# Patient Record
Sex: MALE | Race: WHITE | NOT HISPANIC OR LATINO | Employment: OTHER | URBAN - METROPOLITAN AREA
[De-identification: names, ages, dates, MRNs, and addresses within clinical notes are randomized per-mention and may not be internally consistent; named-entity substitution may affect disease eponyms.]

---

## 2023-03-07 DIAGNOSIS — R10.13 ABDOMINAL PAIN, ACUTE, EPIGASTRIC: Primary | ICD-10-CM

## 2023-03-07 PROBLEM — L30.4 INTERTRIGO: Status: ACTIVE | Noted: 2023-03-07

## 2023-03-07 PROBLEM — M51.26 LUMBAR HERNIATED DISC: Status: ACTIVE | Noted: 2023-03-07

## 2023-03-07 PROBLEM — R73.09 ABNORMAL GLUCOSE: Status: ACTIVE | Noted: 2023-03-07

## 2023-03-07 PROBLEM — R79.89 LOW TSH LEVEL: Status: ACTIVE | Noted: 2023-03-07

## 2023-03-07 PROBLEM — H81.10 BPPV (BENIGN PAROXYSMAL POSITIONAL VERTIGO): Status: ACTIVE | Noted: 2023-03-07

## 2023-03-07 PROBLEM — K22.70 BARRETT'S ESOPHAGUS: Status: ACTIVE | Noted: 2023-03-07

## 2023-03-07 PROBLEM — D49.1 NEOPLASM OF UPPER LOBE OF RIGHT LUNG: Status: ACTIVE | Noted: 2023-03-07

## 2023-03-07 PROBLEM — M54.16 LUMBAR RADICULITIS: Status: ACTIVE | Noted: 2023-03-07

## 2023-03-07 PROBLEM — L30.9 ECZEMA: Status: ACTIVE | Noted: 2023-03-07

## 2023-03-07 PROBLEM — J20.9 ACUTE BRONCHITIS: Status: ACTIVE | Noted: 2023-03-07

## 2023-03-07 PROBLEM — I25.10 CORONARY ATHEROSCLEROSIS: Status: ACTIVE | Noted: 2023-03-07

## 2023-03-07 PROBLEM — R06.02 SHORTNESS OF BREATH ON EXERTION: Status: ACTIVE | Noted: 2023-03-07

## 2023-03-07 PROBLEM — R91.8 LUNG NODULES: Status: ACTIVE | Noted: 2023-03-07

## 2023-03-07 PROBLEM — C34.91: Status: ACTIVE | Noted: 2023-03-07

## 2023-03-07 PROBLEM — R06.09 DYSPNEA ON EXERTION: Status: ACTIVE | Noted: 2023-03-07

## 2023-03-07 PROBLEM — K21.00 GASTROESOPHAGEAL REFLUX DISEASE WITH ESOPHAGITIS: Status: ACTIVE | Noted: 2023-03-07

## 2023-03-07 PROBLEM — R10.9 ABDOMINAL PAIN: Status: ACTIVE | Noted: 2023-03-07

## 2023-03-07 PROBLEM — N40.1 BENIGN PROSTATIC HYPERPLASIA WITH NOCTURIA: Status: ACTIVE | Noted: 2023-03-07

## 2023-03-07 PROBLEM — R97.20 RAISED PROSTATE SPECIFIC ANTIGEN: Status: ACTIVE | Noted: 2023-03-07

## 2023-03-07 PROBLEM — E06.3 ACQUIRED AUTOIMMUNE HYPOTHYROIDISM: Status: ACTIVE | Noted: 2023-03-07

## 2023-03-07 PROBLEM — R35.1 BENIGN PROSTATIC HYPERPLASIA WITH NOCTURIA: Status: ACTIVE | Noted: 2023-03-07

## 2023-03-07 PROBLEM — G47.33 OBSTRUCTIVE SLEEP APNEA: Status: ACTIVE | Noted: 2023-03-07

## 2023-03-07 PROBLEM — E78.00 ELEVATED LDL CHOLESTEROL LEVEL: Status: ACTIVE | Noted: 2023-03-07

## 2023-03-07 PROBLEM — R53.83 FATIGUE: Status: ACTIVE | Noted: 2023-03-07

## 2023-03-07 RX ORDER — LEVOTHYROXINE SODIUM 50 UG/1
TABLET ORAL
COMMUNITY
End: 2023-08-18

## 2023-03-07 RX ORDER — ASCORBIC ACID 500 MG
TABLET ORAL
COMMUNITY
Start: 2022-11-14 | End: 2023-08-28 | Stop reason: ALTCHOICE

## 2023-03-07 RX ORDER — PANTOPRAZOLE SODIUM 20 MG/1
TABLET, DELAYED RELEASE ORAL
COMMUNITY
End: 2023-03-07

## 2023-03-07 RX ORDER — ZINC SULFATE 50(220)MG
CAPSULE ORAL
COMMUNITY
Start: 2022-11-14 | End: 2023-08-28 | Stop reason: ALTCHOICE

## 2023-03-07 RX ORDER — TRIAMCINOLONE ACETONIDE 1 MG/G
CREAM TOPICAL 2 TIMES DAILY
COMMUNITY
Start: 2021-12-17 | End: 2023-08-28 | Stop reason: ALTCHOICE

## 2023-03-07 RX ORDER — TAMSULOSIN HYDROCHLORIDE 0.4 MG/1
0.4 CAPSULE ORAL NIGHTLY
COMMUNITY

## 2023-03-07 RX ORDER — PANTOPRAZOLE SODIUM 40 MG/1
TABLET, DELAYED RELEASE ORAL
Qty: 30 TABLET | Refills: 0 | Status: SHIPPED | OUTPATIENT
Start: 2023-03-07 | End: 2023-04-12 | Stop reason: SDUPTHER

## 2023-03-07 RX ORDER — ATORVASTATIN CALCIUM 40 MG/1
40 TABLET, FILM COATED ORAL NIGHTLY
COMMUNITY

## 2023-03-07 RX ORDER — FAMOTIDINE 20 MG/1
20 TABLET, FILM COATED ORAL NIGHTLY
COMMUNITY
Start: 2023-02-28 | End: 2023-08-28 | Stop reason: ALTCHOICE

## 2023-03-07 RX ORDER — ACETAMINOPHEN, DIPHENHYDRAMINE HCL, PHENYLEPHRINE HCL 325; 25; 5 MG/1; MG/1; MG/1
TABLET ORAL
COMMUNITY
Start: 2022-11-14 | End: 2023-08-28 | Stop reason: ALTCHOICE

## 2023-03-07 RX ORDER — CHOLECALCIFEROL (VITAMIN D3) 25 MCG
TABLET ORAL
COMMUNITY
Start: 2022-11-14 | End: 2023-08-28 | Stop reason: ALTCHOICE

## 2023-04-12 ENCOUNTER — TELEPHONE (OUTPATIENT)
Dept: PRIMARY CARE | Facility: CLINIC | Age: 78
End: 2023-04-12
Payer: MEDICARE

## 2023-04-12 DIAGNOSIS — R10.13 ABDOMINAL PAIN, ACUTE, EPIGASTRIC: ICD-10-CM

## 2023-04-12 RX ORDER — PANTOPRAZOLE SODIUM 40 MG/1
40 TABLET, DELAYED RELEASE ORAL DAILY
Qty: 30 TABLET | Refills: 0 | Status: SHIPPED | OUTPATIENT
Start: 2023-04-12 | End: 2023-05-15 | Stop reason: SDUPTHER

## 2023-04-12 NOTE — TELEPHONE ENCOUNTER
Rx Refill Request Telephone Encounter    Name:  Maxwell Villatoro  :  [id number]  Medication Name:  Pantoprazole 40mg 1 tab qd    Specific Pharmacy location:  [facility]  Date of last appointment:  23  Date of next appointment:  23  Best number to reach patient:  [phone]

## 2023-05-15 ENCOUNTER — TELEPHONE (OUTPATIENT)
Dept: PRIMARY CARE | Facility: CLINIC | Age: 78
End: 2023-05-15
Payer: MEDICARE

## 2023-05-15 DIAGNOSIS — R10.13 ABDOMINAL PAIN, ACUTE, EPIGASTRIC: ICD-10-CM

## 2023-05-15 RX ORDER — PANTOPRAZOLE SODIUM 40 MG/1
40 TABLET, DELAYED RELEASE ORAL DAILY
Qty: 90 TABLET | Refills: 1 | Status: SHIPPED | OUTPATIENT
Start: 2023-05-15 | End: 2023-11-10

## 2023-05-15 NOTE — TELEPHONE ENCOUNTER
Rx Refill Request Telephone Encounter  PT IS OUT  Name:  Maxwell Villatoro  :  [id number]  Medication Name:    Pantoprazole 40 mg 1 tab every day -90 day  Specific Pharmacy location:  [facility]  Date of last appointment:  23  Date of next appointment:  23  Best number to reach patient:  [phone]

## 2023-07-20 ENCOUNTER — APPOINTMENT (OUTPATIENT)
Dept: LAB | Facility: LAB | Age: 78
End: 2023-07-20
Payer: MEDICARE

## 2023-07-21 LAB — PROSTATE SPECIFIC AG (NG/ML) IN SER/PLAS: 5.89 NG/ML (ref 0–4)

## 2023-08-18 DIAGNOSIS — E06.3 ACQUIRED AUTOIMMUNE HYPOTHYROIDISM: Primary | ICD-10-CM

## 2023-08-18 PROBLEM — M25.562 LEFT KNEE PAIN: Status: RESOLVED | Noted: 2023-08-18 | Resolved: 2023-08-18

## 2023-08-18 PROBLEM — M71.22 SYNOVIAL CYST OF LEFT KNEE: Status: ACTIVE | Noted: 2023-08-18

## 2023-08-18 PROBLEM — K40.20 NON-RECURRENT BILATERAL INGUINAL HERNIA WITHOUT OBSTRUCTION OR GANGRENE: Status: ACTIVE | Noted: 2023-08-18

## 2023-08-18 PROBLEM — K42.9 UMBILICAL HERNIA WITHOUT OBSTRUCTION OR GANGRENE: Status: ACTIVE | Noted: 2023-08-18

## 2023-08-18 RX ORDER — LEVOTHYROXINE SODIUM 25 UG/1
TABLET ORAL
COMMUNITY
End: 2023-08-28 | Stop reason: DRUGHIGH

## 2023-08-18 RX ORDER — LEVOTHYROXINE SODIUM 50 UG/1
TABLET ORAL
Qty: 98 TABLET | Refills: 0 | Status: SHIPPED | OUTPATIENT
Start: 2023-08-18 | End: 2023-11-10

## 2023-08-28 ENCOUNTER — OFFICE VISIT (OUTPATIENT)
Dept: PRIMARY CARE | Facility: CLINIC | Age: 78
End: 2023-08-28
Payer: MEDICARE

## 2023-08-28 VITALS
BODY MASS INDEX: 30.63 KG/M2 | SYSTOLIC BLOOD PRESSURE: 118 MMHG | DIASTOLIC BLOOD PRESSURE: 70 MMHG | HEART RATE: 74 BPM | OXYGEN SATURATION: 98 % | HEIGHT: 66 IN | WEIGHT: 190.6 LBS

## 2023-08-28 DIAGNOSIS — Z13.1 SCREENING FOR DIABETES MELLITUS: ICD-10-CM

## 2023-08-28 DIAGNOSIS — C34.91 SQUAMOUS CELL CARCINOMA OF LUNG, RIGHT (MULTI): ICD-10-CM

## 2023-08-28 DIAGNOSIS — E06.3 ACQUIRED AUTOIMMUNE HYPOTHYROIDISM: ICD-10-CM

## 2023-08-28 DIAGNOSIS — Z00.00 ROUTINE GENERAL MEDICAL EXAMINATION AT HEALTH CARE FACILITY: Primary | ICD-10-CM

## 2023-08-28 DIAGNOSIS — I25.10 ATHEROSCLEROSIS OF NATIVE CORONARY ARTERY OF NATIVE HEART WITHOUT ANGINA PECTORIS: ICD-10-CM

## 2023-08-28 DIAGNOSIS — R73.09 ABNORMAL GLUCOSE: ICD-10-CM

## 2023-08-28 DIAGNOSIS — R06.09 DYSPNEA ON EXERTION: ICD-10-CM

## 2023-08-28 DIAGNOSIS — G47.33 OBSTRUCTIVE SLEEP APNEA: ICD-10-CM

## 2023-08-28 DIAGNOSIS — D49.1 NEOPLASM OF UPPER LOBE OF RIGHT LUNG: ICD-10-CM

## 2023-08-28 DIAGNOSIS — Z12.5 SCREENING FOR PROSTATE CANCER: ICD-10-CM

## 2023-08-28 PROBLEM — R31.9 HEMATURIA SYNDROME: Status: RESOLVED | Noted: 2023-08-28 | Resolved: 2023-08-28

## 2023-08-28 PROCEDURE — G0439 PPPS, SUBSEQ VISIT: HCPCS | Performed by: NURSE PRACTITIONER

## 2023-08-28 PROCEDURE — 1159F MED LIST DOCD IN RCRD: CPT | Performed by: NURSE PRACTITIONER

## 2023-08-28 PROCEDURE — 1036F TOBACCO NON-USER: CPT | Performed by: NURSE PRACTITIONER

## 2023-08-28 PROCEDURE — 1160F RVW MEDS BY RX/DR IN RCRD: CPT | Performed by: NURSE PRACTITIONER

## 2023-08-28 PROCEDURE — 1170F FXNL STATUS ASSESSED: CPT | Performed by: NURSE PRACTITIONER

## 2023-08-28 ASSESSMENT — PATIENT HEALTH QUESTIONNAIRE - PHQ9
SUM OF ALL RESPONSES TO PHQ9 QUESTIONS 1 AND 2: 0
2. FEELING DOWN, DEPRESSED OR HOPELESS: NOT AT ALL
1. LITTLE INTEREST OR PLEASURE IN DOING THINGS: NOT AT ALL

## 2023-08-28 ASSESSMENT — ENCOUNTER SYMPTOMS
OCCASIONAL FEELINGS OF UNSTEADINESS: 0
MUSCULOSKELETAL NEGATIVE: 1
CONSTITUTIONAL NEGATIVE: 1
PSYCHIATRIC NEGATIVE: 1
LOSS OF SENSATION IN FEET: 0
DEPRESSION: 0
RESPIRATORY NEGATIVE: 1

## 2023-08-28 ASSESSMENT — COLUMBIA-SUICIDE SEVERITY RATING SCALE - C-SSRS
2. HAVE YOU ACTUALLY HAD ANY THOUGHTS OF KILLING YOURSELF?: NO
6. HAVE YOU EVER DONE ANYTHING, STARTED TO DO ANYTHING, OR PREPARED TO DO ANYTHING TO END YOUR LIFE?: NO
1. IN THE PAST MONTH, HAVE YOU WISHED YOU WERE DEAD OR WISHED YOU COULD GO TO SLEEP AND NOT WAKE UP?: NO

## 2023-08-28 ASSESSMENT — ACTIVITIES OF DAILY LIVING (ADL)
GROCERY_SHOPPING: INDEPENDENT
TAKING_MEDICATION: INDEPENDENT
DRESSING: INDEPENDENT
MANAGING_FINANCES: INDEPENDENT
BATHING: INDEPENDENT
DOING_HOUSEWORK: INDEPENDENT

## 2023-08-28 NOTE — PROGRESS NOTES
"Patient was identified as a fall risk. Risk prevention instructions provided.  Subjective   Reason for Visit: Maxwell Villatoro is an 78 y.o. male here for a Medicare Wellness visit.     Past Medical, Surgical, and Family History reviewed and updated in chart.    Reviewed all medications by prescribing practitioner or clinical pharmacist (such as prescriptions, OTCs, herbal therapies and supplements) and documented in the medical record.    Patient here for Medicare wellness    History of lung CA: Patient had a recent scan following up annually with oncology.  No new symptoms or concerns at this time    GERD: Patient reports the symptoms are stable     Acquired hypothyroid: Patient denies any symptoms of hyper or hypothyroid at this time patient due for routine blood work    Cardiovascular disease: Patient is followed by Dr. Multani states follows up with him annually    Patient has no other major concerns today        Patient Care Team:  PRETTY Johnson-CNP as PCP - General (Family Medicine)  Nimesh Gayle DO as PCP - [employer] PCP   Dr Multani: Cardiology  Oncology: Was seen by the Nurse practitioner   Review of Systems   Constitutional: Negative.    HENT: Negative.     Respiratory: Negative.     Musculoskeletal: Negative.    Skin: Negative.    Psychiatric/Behavioral: Negative.         Objective   Vitals:  /70   Pulse 74   Ht 1.676 m (5' 6\")   Wt 86.5 kg (190 lb 9.6 oz)   SpO2 98%   BMI 30.76 kg/m²       Physical Exam  Constitutional:       Appearance: Normal appearance.   Eyes:      Conjunctiva/sclera: Conjunctivae normal.   Cardiovascular:      Rate and Rhythm: Normal rate.      Heart sounds: Normal heart sounds.   Pulmonary:      Effort: Pulmonary effort is normal.      Breath sounds: Normal breath sounds.   Abdominal:      General: Abdomen is flat. There is no distension.      Palpations: Abdomen is soft. There is no mass.      Tenderness: There is no abdominal tenderness. There is no " right CVA tenderness or left CVA tenderness.      Hernia: No hernia is present.   Musculoskeletal:         General: No swelling or tenderness. Normal range of motion.   Skin:     General: Skin is warm.   Neurological:      General: No focal deficit present.      Mental Status: He is alert and oriented to person, place, and time.      Cranial Nerves: No cranial nerve deficit.      Sensory: No sensory deficit.      Motor: No weakness.      Coordination: Coordination normal.   Psychiatric:         Mood and Affect: Mood normal.         Behavior: Behavior normal.         Thought Content: Thought content normal.         Judgment: Judgment normal.         Assessment/Plan   Problem List Items Addressed This Visit       Abnormal glucose    Relevant Orders    Hemoglobin A1C    Acquired autoimmune hypothyroidism    Relevant Orders    TSH with reflex to Free T4 if abnormal    Comprehensive Metabolic Panel    Coronary atherosclerosis    Relevant Orders    Lipid Panel    Comprehensive Metabolic Panel    Neoplasm of upper lobe of right lung    Current Assessment & Plan     He reports stable is to have one more scan/PET scan         Obstructive sleep apnea    Current Assessment & Plan     Stable does not use cpap. He feels rested          Relevant Orders    TSH with reflex to Free T4 if abnormal    Dyspnea on exertion    Current Assessment & Plan     This baseline denies any increasing sob         Squamous cell carcinoma of lung, right (CMS/HCC)     Other Visit Diagnoses       Routine general medical examination at health care facility    -  Primary    Screening for prostate cancer        Relevant Orders    Prostate Specific Antigen, Screen    Screening for diabetes mellitus        Relevant Orders    Hemoglobin A1C          Patient to follow-up every 6 months routine blood work ordered.  Patient stable.  Encouraged given to patient to increase physical activity and exercise.

## 2023-08-28 NOTE — PATIENT INSTRUCTIONS
"   want to emphasize the importance of regular exercise for maintaining and improving your overall health and well-being. Engaging in physical activity is one of the most beneficial things you can do for your body and mind. Here are some key reasons why exercise is crucial for your health:    1. Cardiovascular Health:    Exercise strengthens your heart and improves circulation, reducing the risk of heart disease, high blood pressure, and stroke.  Regular aerobic exercise, like walking, jogging, or cycling, helps keep your cardiovascular system in excellent condition.  2. Weight Management:    Regular physical activity plays a vital role in managing and maintaining a healthy weight.  Combining exercise with a balanced diet can help you achieve and sustain a healthy body weight.  3. Muscle Strength and Flexibility:    Exercise helps build and maintain strong muscles, which support and protect your bones and joints.  Stretching exercises improve flexibility, reducing the risk of injuries and promoting better posture.  4. Bone Health:    Weight-bearing exercises, such as walking, dancing, or weightlifting, stimulate bone growth and help prevent bone loss as you age.  Strong bones reduce the risk of osteoporosis and fractures.  5. Mental Health and Mood:    Physical activity triggers the release of endorphins, the \"feel-good\" hormones, which can help reduce stress, anxiety, and depression.  Regular exercise is associated with improved mood, better sleep, and enhanced cognitive function.  6. Energy and Endurance:    Exercise boosts your energy levels and increases your stamina, making daily activities easier to accomplish.  Improved endurance allows you to engage in activities for more extended periods without feeling fatigued.  7. Chronic Disease Prevention:    Regular exercise can help prevent or manage various chronic conditions, such as type 2 diabetes, certain cancers, and metabolic syndrome.  8. Immune System " Support:    Exercise can enhance the immune system, making it more effective in defending against illnesses and infections.  9. Social Interaction:    Participating in group exercise classes or outdoor activities provides opportunities for social interaction, reducing feelings of isolation and loneliness.  10. Longevity and Quality of Life:    Studies show that individuals who maintain an active lifestyle tend to live longer and enjoy a higher quality of life.   Ways to Help Prevent Falls at Home    Quick Tips   ? Ask for help if you need it. Most people want to help!   ? Get up slowly after sitting or laying down   ? Wear a medical alert device or keep cell phone in your pocket   ? Use night lights, especially areas near a bathroom   ? Keep the items you use often within reach on a small stool or end table   ? Use an assistive device such as walker or cane, as directed by provider/physical therapy   ? Use a non-slip mat and grab bars in your bathroom. Look for home health sections for best options     Other Areas to Focus On   ? Exercise and nutrition: Regular exercise or taking a falls prevention class are great ways improve strength and balance. Don’t forget to stay hydrated and bring a snack!   ? Medicine side effects: Some medicines can make you sleepy or dizzy, which could cause a fall. Ask your healthcare provider about the side effects your medicines could cause. Be sure to let them know if you take any vitamins or supplements as well.   ? Tripping hazards: Remove items you could trip on, such as loose mats, rugs, cords, and clutter. Wear closed toe shoes with rubber soles.   ? Health and wellness: Get regular checkups with your healthcare provider, plus routine vision and hearing screenings. Talk with your healthcare provider about:   o Your medicines and the possible side effects - bring them in a bag if that is easier!   o Problems with balance or feeling dizzy   o Ways to promote bone health, such as  Vitamin D and calcium supplements   o Questions or concerns about falling     *Ask your healthcare team if you have questions     ©[facility], 2022

## 2023-10-31 ENCOUNTER — LAB (OUTPATIENT)
Dept: LAB | Facility: LAB | Age: 78
End: 2023-10-31
Payer: MEDICARE

## 2023-10-31 DIAGNOSIS — Z12.5 SCREENING FOR PROSTATE CANCER: ICD-10-CM

## 2023-10-31 DIAGNOSIS — R73.09 ABNORMAL GLUCOSE: ICD-10-CM

## 2023-10-31 DIAGNOSIS — Z13.1 SCREENING FOR DIABETES MELLITUS: ICD-10-CM

## 2023-10-31 DIAGNOSIS — G47.33 OBSTRUCTIVE SLEEP APNEA: ICD-10-CM

## 2023-10-31 DIAGNOSIS — I25.10 ATHEROSCLEROSIS OF NATIVE CORONARY ARTERY OF NATIVE HEART WITHOUT ANGINA PECTORIS: ICD-10-CM

## 2023-10-31 DIAGNOSIS — E06.3 ACQUIRED AUTOIMMUNE HYPOTHYROIDISM: ICD-10-CM

## 2023-10-31 LAB
ALBUMIN SERPL BCP-MCNC: 4.2 G/DL (ref 3.4–5)
ALP SERPL-CCNC: 75 U/L (ref 33–136)
ALT SERPL W P-5'-P-CCNC: 17 U/L (ref 10–52)
ANION GAP SERPL CALC-SCNC: 12 MMOL/L (ref 10–20)
AST SERPL W P-5'-P-CCNC: 18 U/L (ref 9–39)
BILIRUB SERPL-MCNC: 0.6 MG/DL (ref 0–1.2)
BUN SERPL-MCNC: 12 MG/DL (ref 6–23)
CALCIUM SERPL-MCNC: 9.1 MG/DL (ref 8.6–10.3)
CHLORIDE SERPL-SCNC: 106 MMOL/L (ref 98–107)
CHOLEST SERPL-MCNC: 115 MG/DL (ref 0–199)
CHOLESTEROL/HDL RATIO: 3.4
CO2 SERPL-SCNC: 26 MMOL/L (ref 21–32)
CREAT SERPL-MCNC: 0.97 MG/DL (ref 0.5–1.3)
GFR SERPL CREATININE-BSD FRML MDRD: 80 ML/MIN/1.73M*2
GLUCOSE SERPL-MCNC: 73 MG/DL (ref 74–99)
HDLC SERPL-MCNC: 34.3 MG/DL
LDLC SERPL CALC-MCNC: 63 MG/DL
NON HDL CHOLESTEROL: 81 MG/DL (ref 0–149)
POTASSIUM SERPL-SCNC: 4.2 MMOL/L (ref 3.5–5.3)
PROT SERPL-MCNC: 6.8 G/DL (ref 6.4–8.2)
SODIUM SERPL-SCNC: 140 MMOL/L (ref 136–145)
TRIGL SERPL-MCNC: 91 MG/DL (ref 0–149)
TSH SERPL-ACNC: 3.64 MIU/L (ref 0.44–3.98)
VLDL: 18 MG/DL (ref 0–40)

## 2023-10-31 PROCEDURE — 80061 LIPID PANEL: CPT

## 2023-10-31 PROCEDURE — 80053 COMPREHEN METABOLIC PANEL: CPT

## 2023-10-31 PROCEDURE — 84443 ASSAY THYROID STIM HORMONE: CPT

## 2023-10-31 PROCEDURE — G0103 PSA SCREENING: HCPCS

## 2023-10-31 PROCEDURE — 83036 HEMOGLOBIN GLYCOSYLATED A1C: CPT

## 2023-10-31 PROCEDURE — 36415 COLL VENOUS BLD VENIPUNCTURE: CPT

## 2023-11-01 LAB
EST. AVERAGE GLUCOSE BLD GHB EST-MCNC: 97 MG/DL
HBA1C MFR BLD: 5 %
PSA SERPL-MCNC: 5.29 NG/ML

## 2023-11-03 ENCOUNTER — TELEPHONE (OUTPATIENT)
Dept: PRIMARY CARE | Facility: CLINIC | Age: 78
End: 2023-11-03
Payer: MEDICARE

## 2023-11-03 NOTE — TELEPHONE ENCOUNTER
----- Message from Diane Fields APRN-CNP sent at 11/2/2023  8:43 AM EDT -----  PSA Slightly Elevated: Your PSA (Prostate-Specific Antigen) level is slightly elevated. We recommend that you follow up with a new healthcare provider to have this checked again and discuss any further steps.    Hemoglobin A1c Normal: Your Hemoglobin A1c level is well within the normal range, indicating good blood sugar control.    Thyroid Normal: Your thyroid function is within normal limits.    Lipid Panel Controlled: Your lipid panel shows good control, which is positive for heart health.    CMP Stable: Your Comprehensive Metabolic Panel (CMP) is stable, with only a slightly low glucose level, which is not a significant concern, especially if you were fasting.

## 2023-11-03 NOTE — TELEPHONE ENCOUNTER
Result Communication    Resulted Orders   Prostate Specific Antigen, Screen   Result Value Ref Range    Prostate Specific Antigen,Screen 5.29 (H) <=4.00 ng/mL    Narrative    The FDA requires that the method used for PSA assay be reported to the physician. Values obtained with different assay methods must not be used interchangeably. This test was performed at [facility] using Siemens NVMdurance PSA method, which is a sandwich immunoassay using chemiluminescence for quantitation. The assay is approved for measurement of prostate-specific antigen (PSA) in serum and may be used in conjunction with a digital rectal examination in men 50 years and older as an aid in the detection of prostate cancer. 5 Alpha-reductase inhibitors (e.g., Proscar, Finasteride, Avodart, Dutasteride, and Josselin) for the treatment of BPH have been shown to lower PSA levels by an average of 50% after 6 months of treatment.        TSH with reflex to Free T4 if abnormal   Result Value Ref Range    Thyroid Stimulating Hormone 3.64 0.44 - 3.98 mIU/L    Narrative    TSH testing is performed using different testing methodology at [facility] than at other [facility]. Direct result comparisons should only be made within the same method.     Lipid Panel   Result Value Ref Range    Cholesterol 115 0 - 199 mg/dL      Comment:            Age      Desirable   Borderline High   High     0-19 Y     0 - 169       170 - 199     >/= 200    20-24 Y     0 - 189       190 - 224     >/= 225         >24 Y     0 - 199       200 - 239     >/= 240   **All ranges are based on fasting samples. Specific   therapeutic targets will vary based on patient-specific   cardiac risk.    Pediatric guidelines reference:Pediatrics 2011, 128(S5).Adult guidelines reference: NCEP ATPIII Guidelines,ROBERT 2001, 258:2486-97    Venipuncture immediately after or during the administration of Metamizole may lead to falsely low results. Testing should be  performed immediately prior to Metamizole dosing.    HDL-Cholesterol 34.3 mg/dL      Comment:        Age       Very Low   Low     Normal    High    0-19 Y    < 35      < 40     40-45     ----  20-24 Y    ----     < 40      >45      ----        >24 Y      ----     < 40     40-60      >60      Cholesterol/HDL Ratio 3.4       Comment:        Ref Values  Desirable  < 3.4  High Risk  > 5.0    LDL Calculated 63 <=99 mg/dL      Comment:                                  Near   Borderline      AGE      Desirable  Optimal    High     High     Very High     0-19 Y     0 - 109     ---    110-129   >/= 130     ----    20-24 Y     0 - 119     ---    120-159   >/= 160     ----      >24 Y     0 -  99   100-129  130-159   160-189     >/=190      VLDL 18 0 - 40 mg/dL    Triglycerides 91 0 - 149 mg/dL      Comment:         Age         Desirable   Borderline High   High     Very High   0 D-90 D    19 - 174         ----         ----        ----  91 D- 9 Y     0 -  74        75 -  99     >/= 100      ----    10-19 Y     0 -  89        90 - 129     >/= 130      ----    20-24 Y     0 - 114       115 - 149     >/= 150      ----         >24 Y     0 - 149       150 - 199    200- 499    >/= 500    Venipuncture immediately after or during the administration of Metamizole may lead to falsely low results. Testing should be performed immediately prior to Metamizole dosing.    Non HDL Cholesterol 81 0 - 149 mg/dL      Comment:            Age       Desirable   Borderline High   High     Very High     0-19 Y     0 - 119       120 - 144     >/= 145    >/= 160    20-24 Y     0 - 149       150 - 189     >/= 190      ----         >24 Y    30 mg/dL above LDL Cholesterol goal     Comprehensive Metabolic Panel   Result Value Ref Range    Glucose 73 (L) 74 - 99 mg/dL    Sodium 140 136 - 145 mmol/L    Potassium 4.2 3.5 - 5.3 mmol/L    Chloride 106 98 - 107 mmol/L    Bicarbonate 26 21 - 32 mmol/L    Anion Gap 12 10 - 20 mmol/L    Urea Nitrogen 12 6 - 23 mg/dL     Creatinine 0.97 0.50 - 1.30 mg/dL    eGFR 80 >60 mL/min/1.73m*2      Comment:      Calculations of estimated GFR are performed using the 2021 CKD-EPI Study Refit equation without the race variable for the IDMS-Traceable creatinine methods.  https://jasn.asnjournals.org/content/early/2021/09/22/ASN.1633480076    Calcium 9.1 8.6 - 10.3 mg/dL    Albumin 4.2 3.4 - 5.0 g/dL    Alkaline Phosphatase 75 33 - 136 U/L    Total Protein 6.8 6.4 - 8.2 g/dL    AST 18 9 - 39 U/L    Bilirubin, Total 0.6 0.0 - 1.2 mg/dL    ALT 17 10 - 52 U/L      Comment:      Patients treated with Sulfasalazine may generate falsely decreased results for ALT.   Hemoglobin A1C   Result Value Ref Range    Hemoglobin A1C 5.0 see below %    Estimated Average Glucose 97 Not Established mg/dL    Narrative    Diagnosis of Diabetes-Adults  Non-Diabetic: < or = 5.6%  Increased risk for developing diabetes: 5.7-6.4%  Diagnostic of diabetes: > or = 6.5%    Monitoring of Diabetes  Age (y)....................... Therapeutic Goal (%)  Adults: >18.........................<7.0  Pediatrics: 13-18...................<7.5  Pediatrics: 7-12....................<8.0  Pediatrics: 0-6..................... 7.5-8.5    American Diabetes Association. Diabetes Care 33(S1), Jan 2010           2:25 PM      Results were successfully communicated with the  spouse Esha   and they acknowledged their understanding.

## 2023-11-10 DIAGNOSIS — R10.13 ABDOMINAL PAIN, ACUTE, EPIGASTRIC: ICD-10-CM

## 2023-11-10 DIAGNOSIS — E06.3 ACQUIRED AUTOIMMUNE HYPOTHYROIDISM: ICD-10-CM

## 2023-11-10 RX ORDER — FAMOTIDINE 20 MG/1
20 TABLET, FILM COATED ORAL NIGHTLY
Qty: 90 TABLET | Refills: 0 | Status: SHIPPED | OUTPATIENT
Start: 2023-11-10 | End: 2023-12-01

## 2023-11-10 RX ORDER — LEVOTHYROXINE SODIUM 50 UG/1
TABLET ORAL
Qty: 98 TABLET | Refills: 0 | Status: SHIPPED | OUTPATIENT
Start: 2023-11-10 | End: 2024-02-29 | Stop reason: SDUPTHER

## 2023-11-10 RX ORDER — PANTOPRAZOLE SODIUM 40 MG/1
40 TABLET, DELAYED RELEASE ORAL DAILY
Qty: 90 TABLET | Refills: 0 | Status: SHIPPED | OUTPATIENT
Start: 2023-11-10 | End: 2024-02-12

## 2023-12-01 ENCOUNTER — OFFICE VISIT (OUTPATIENT)
Dept: PRIMARY CARE | Facility: CLINIC | Age: 78
End: 2023-12-01
Payer: MEDICARE

## 2023-12-01 VITALS
WEIGHT: 200 LBS | OXYGEN SATURATION: 95 % | DIASTOLIC BLOOD PRESSURE: 88 MMHG | HEART RATE: 64 BPM | SYSTOLIC BLOOD PRESSURE: 126 MMHG | HEIGHT: 66 IN | BODY MASS INDEX: 32.14 KG/M2

## 2023-12-01 DIAGNOSIS — R97.20 ELEVATED PSA: Primary | ICD-10-CM

## 2023-12-01 PROCEDURE — 1036F TOBACCO NON-USER: CPT | Performed by: FAMILY MEDICINE

## 2023-12-01 PROCEDURE — 1160F RVW MEDS BY RX/DR IN RCRD: CPT | Performed by: FAMILY MEDICINE

## 2023-12-01 PROCEDURE — 99213 OFFICE O/P EST LOW 20 MIN: CPT | Performed by: FAMILY MEDICINE

## 2023-12-01 PROCEDURE — 1159F MED LIST DOCD IN RCRD: CPT | Performed by: FAMILY MEDICINE

## 2023-12-01 ASSESSMENT — PATIENT HEALTH QUESTIONNAIRE - PHQ9
2. FEELING DOWN, DEPRESSED OR HOPELESS: NOT AT ALL
1. LITTLE INTEREST OR PLEASURE IN DOING THINGS: NOT AT ALL
SUM OF ALL RESPONSES TO PHQ9 QUESTIONS 1 AND 2: 0

## 2023-12-01 ASSESSMENT — ENCOUNTER SYMPTOMS
DIZZINESS: 0
COUGH: 0
DIARRHEA: 0
CONFUSION: 0
WEAKNESS: 0
OCCASIONAL FEELINGS OF UNSTEADINESS: 0
NUMBNESS: 0
DYSURIA: 0
FEVER: 0
LIGHT-HEADEDNESS: 0
TROUBLE SWALLOWING: 0
DEPRESSION: 0
UNEXPECTED WEIGHT CHANGE: 0
WHEEZING: 0
DIFFICULTY URINATING: 0
BLOOD IN STOOL: 0
CHILLS: 0
VOMITING: 0
SHORTNESS OF BREATH: 0
NAUSEA: 0
LOSS OF SENSATION IN FEET: 0
ABDOMINAL PAIN: 0

## 2023-12-01 NOTE — PROGRESS NOTES
"Subjective   Patient ID: Maxwell Villatoro is a 78 y.o. male who presents for Follow-up (Pt presents with spouse for med review, has questions but going to ask his specialist,  no refills needed.BL).  HPI    Generally feeling well.     Needs no refills currently.    Reports he did have the flu shot this season.      Review of Systems   Constitutional:  Negative for chills, fever and unexpected weight change.   HENT:  Negative for ear pain and trouble swallowing.    Respiratory:  Negative for cough, shortness of breath and wheezing.    Cardiovascular:  Negative for chest pain.   Gastrointestinal:  Negative for abdominal pain, blood in stool, diarrhea, nausea and vomiting.   Genitourinary:  Negative for difficulty urinating and dysuria.   Skin:  Negative for rash.   Neurological:  Negative for dizziness, syncope, weakness, light-headedness and numbness.   Psychiatric/Behavioral:  Negative for behavioral problems and confusion.          Objective   /88   Pulse 64   Ht 1.676 m (5' 6\")   Wt 90.7 kg (200 lb)   SpO2 95%   BMI 32.28 kg/m²     Physical Exam  Vitals and nursing note reviewed.   Constitutional:       General: He is not in acute distress.     Appearance: Normal appearance. He is not diaphoretic.   HENT:      Head: Normocephalic and atraumatic.   Eyes:      General: No scleral icterus.     Extraocular Movements: Extraocular movements intact.      Conjunctiva/sclera: Conjunctivae normal.   Cardiovascular:      Rate and Rhythm: Normal rate and regular rhythm.      Heart sounds: Normal heart sounds.   Pulmonary:      Effort: Pulmonary effort is normal. No respiratory distress.      Breath sounds: Normal breath sounds.   Abdominal:      General: Bowel sounds are normal. There is no distension.      Palpations: Abdomen is soft. There is no mass.      Tenderness: There is no abdominal tenderness. There is no guarding or rebound.   Musculoskeletal:      Right lower leg: No edema.      Left lower leg: No edema. "   Skin:     General: Skin is warm and dry.      Coloration: Skin is not jaundiced.   Neurological:      General: No focal deficit present.      Mental Status: He is alert and oriented to person, place, and time. Mental status is at baseline.   Psychiatric:         Mood and Affect: Mood normal.         Behavior: Behavior normal.         Thought Content: Thought content normal.         Assessment/Plan   Problem List Items Addressed This Visit       Elevated PSA - Primary     Continue with urology Dr. Blum.

## 2023-12-01 NOTE — PATIENT INSTRUCTIONS
Recommend the RSV (respiratory syncytial virus) vaccine, completing the Shingrix (shingles) vaccines series.    Please return for a follow-up appointment in 6 months, earlier if any question or concern.      For assistance with scheduling referrals or consultations, please call [phone]. For laboratory, radiology, and other tests, please call [phone] ([phone] for pediatrics). Please review prescription inserts and published information for possible adverse effects of all medications. Return after testing or consultation to review results and recommendations, if symptoms persist, change, worsen, or return, or if you have any question or concern. If you do not get results within 7-10 days, or you have any question or concern, please send a message, call the office ([phone]), or return to the office for a follow-up appointment. For non-emergencies, you may call the office. For emergencies, call 9-1-1 or go to the nearest Emergency Department. Please schedule additional appointment(s) to address concern(s) not addressed today.    In general, results are not released or discussed over the telephone. Results of tests done through [facility] are released via  [employer] (see below).  [email or web address]   [employer] support line: [phone]    Until we complete our transition to the new system, additional information can be found at [email or web address] or on your Android or iOS (iPhone, iPad) device using the [employer] luis e available free of charge in your device's luis e store.

## 2024-01-11 ENCOUNTER — OFFICE VISIT (OUTPATIENT)
Dept: SURGERY | Facility: CLINIC | Age: 79
End: 2024-01-11
Payer: MEDICARE

## 2024-01-11 VITALS
WEIGHT: 202.4 LBS | HEART RATE: 66 BPM | DIASTOLIC BLOOD PRESSURE: 87 MMHG | RESPIRATION RATE: 16 BRPM | BODY MASS INDEX: 30.68 KG/M2 | SYSTOLIC BLOOD PRESSURE: 158 MMHG | TEMPERATURE: 97.5 F | HEIGHT: 68 IN | OXYGEN SATURATION: 99 %

## 2024-01-11 DIAGNOSIS — R10.9: Primary | ICD-10-CM

## 2024-01-11 DIAGNOSIS — R10.84 GENERALIZED ABDOMINAL PAIN: Primary | ICD-10-CM

## 2024-01-11 PROCEDURE — 99213 OFFICE O/P EST LOW 20 MIN: CPT | Performed by: SURGERY

## 2024-01-11 PROCEDURE — 1159F MED LIST DOCD IN RCRD: CPT | Performed by: SURGERY

## 2024-01-11 PROCEDURE — 1160F RVW MEDS BY RX/DR IN RCRD: CPT | Performed by: SURGERY

## 2024-01-11 PROCEDURE — 1036F TOBACCO NON-USER: CPT | Performed by: SURGERY

## 2024-01-11 NOTE — PROGRESS NOTES
Subjective   Patient ID: Maxwell Villatoro is a 78 y.o. male who presents for Abdominal Pain (EST Abdominal Pain, s/p hernia repairs).  HPI  This is a pleasant gentleman here today to tell me about some of his abdominal complaints.  The patient states that he has this discomfort across his mid abdomen almost constantly.  It does not wake him up at night sometimes twisting or turning seems to make it worse.  It is not any worse with eating.  Occasionally if he is constipated that makes it worse as well.  He had a bilateral laparoscopic inguinal hernia repair in the past and also an open umbilical hernia repair.  These operations were done at [facility].  Review of Systems  10 point review is otherwise negative    Social history he does not smoke or drink alcohol    Family history is noncontributory  Objective   Physical Exam  Head is normocephalic atraumatic eyes extraocular movements are intact he does wear glasses.  Lungs are clear bilaterally.  Heart is regular rate and rhythm.  Abdomen is flat and soft he has some fullness just to the right of the umbilicus.  That is where he is a little sore.  The left side does not feel as full and is not tender there.  There is a small infraumbilical incision.  Extremities do not reveal any gross deformities.  Assessment/Plan highly likely that the patient has a recurrent umbilical hernia as the reason for his discomfort and also the physical exam findings.  I am recommending a CT scan of the abdomen and pelvis to assess this.  He will follow-up with me afterwards.           Alicia Wilson MD 01/11/24 12:04 PM

## 2024-01-15 ENCOUNTER — HOSPITAL ENCOUNTER (OUTPATIENT)
Dept: RADIOLOGY | Facility: HOSPITAL | Age: 79
Discharge: HOME | End: 2024-01-15
Payer: MEDICARE

## 2024-01-15 DIAGNOSIS — R10.84 GENERALIZED ABDOMINAL PAIN: ICD-10-CM

## 2024-01-15 PROCEDURE — A9698 NON-RAD CONTRAST MATERIALNOC: HCPCS | Mod: MUE | Performed by: SURGERY

## 2024-01-15 PROCEDURE — 74177 CT ABD & PELVIS W/CONTRAST: CPT | Performed by: RADIOLOGY

## 2024-01-15 PROCEDURE — 2550000001 HC RX 255 CONTRASTS: Performed by: SURGERY

## 2024-01-15 PROCEDURE — 2550000001 HC RX 255 CONTRASTS: Mod: MUE | Performed by: SURGERY

## 2024-01-15 PROCEDURE — 74177 CT ABD & PELVIS W/CONTRAST: CPT

## 2024-01-15 RX ADMIN — IOHEXOL 500 ML: 12 SOLUTION ORAL at 14:30

## 2024-01-15 RX ADMIN — IOHEXOL 75 ML: 350 INJECTION, SOLUTION INTRAVENOUS at 14:30

## 2024-01-22 ENCOUNTER — APPOINTMENT (OUTPATIENT)
Dept: SURGERY | Facility: CLINIC | Age: 79
End: 2024-01-22
Payer: MEDICARE

## 2024-01-29 ENCOUNTER — TELEPHONE (OUTPATIENT)
Dept: PREADMISSION TESTING | Facility: HOSPITAL | Age: 79
End: 2024-01-29
Payer: MEDICARE

## 2024-01-29 NOTE — TELEPHONE ENCOUNTER
Pt to take all his prescription meds at hs as usual. Follow colonoscopy prep instructions as prescribed.

## 2024-01-29 NOTE — TELEPHONE ENCOUNTER
SURGERY PRE-OPERATIVE INSTRUCTIONS    *You will receive a phone call the day before your procedure  after 2pm, (or the Friday before your surgery if scheduled on a Monday.) Generally the hospital will be calling you with this information after that time.    *You are not to eat after midnight the night before the surgery. You may have 8oz of a clear liquid up until 2 hours prior to arriving to the hospital. The exception is with medications you were instructed to take day of surgery.    *You may take tylenol for pain/discomfort as needed.     *Stop taking all aspirin products, ibuprofen (motrin/advil), naproxen (aleve/naprosyn) for one week prior to surgery.    *Stop taking all vitamins and supplements one week prior to surgery.     *You should not have alcoholic beverages for 24 hours before surgery.     *You should not smoke 24 hours prior to surgery.     *To help prevent surgical infections bathe/shower with Dial soap the evening before surgery.    *You can wear deodorant but no lotion, powder, or perfume/cologne. You should remove all make-up and nail polish at home.    *If you wear glasses, please bring a case for the glasses with you.    *You will be asked to remove dentures and contacts.     *Please leave all valuables at home.    *You should wear loose, comfortable clothing that will accommodate bandages and/or casts.    *You should notify your doctor of any change in your condition (fever, cold, rash, etc). Surgery may need to be re-scheduled until a time you are in better health.    *A responsible adult is required to accompany you to and from the hospital if you are receiving anesthesia or a sedative. Patients are not permitted to drive for 24 hours after anesthesia.     *You can use the [employer] parking if you wish.     *If you have any further questions please call [phone].

## 2024-01-30 ENCOUNTER — ANESTHESIA EVENT (OUTPATIENT)
Dept: OPERATING ROOM | Facility: HOSPITAL | Age: 79
End: 2024-01-30
Payer: MEDICARE

## 2024-01-31 ENCOUNTER — HOSPITAL ENCOUNTER (OUTPATIENT)
Dept: OPERATING ROOM | Facility: HOSPITAL | Age: 79
Setting detail: OUTPATIENT SURGERY
Discharge: HOME | End: 2024-01-31
Payer: MEDICARE

## 2024-01-31 ENCOUNTER — ANESTHESIA (OUTPATIENT)
Dept: OPERATING ROOM | Facility: HOSPITAL | Age: 79
End: 2024-01-31
Payer: MEDICARE

## 2024-01-31 VITALS
HEIGHT: 68 IN | RESPIRATION RATE: 18 BRPM | TEMPERATURE: 96.8 F | SYSTOLIC BLOOD PRESSURE: 110 MMHG | DIASTOLIC BLOOD PRESSURE: 77 MMHG | BODY MASS INDEX: 28.73 KG/M2 | WEIGHT: 189.6 LBS | OXYGEN SATURATION: 97 % | HEART RATE: 76 BPM

## 2024-01-31 DIAGNOSIS — R10.84 GENERALIZED ABDOMINAL PAIN: ICD-10-CM

## 2024-01-31 PROCEDURE — 88342 IMHCHEM/IMCYTCHM 1ST ANTB: CPT | Performed by: STUDENT IN AN ORGANIZED HEALTH CARE EDUCATION/TRAINING PROGRAM

## 2024-01-31 PROCEDURE — 2500000005 HC RX 250 GENERAL PHARMACY W/O HCPCS: Performed by: NURSE ANESTHETIST, CERTIFIED REGISTERED

## 2024-01-31 PROCEDURE — 2500000004 HC RX 250 GENERAL PHARMACY W/ HCPCS (ALT 636 FOR OP/ED): Performed by: ANESTHESIOLOGY

## 2024-01-31 PROCEDURE — 3600000007 HC OR TIME - EACH INCREMENTAL 1 MINUTE - PROCEDURE LEVEL TWO: Performed by: NURSE ANESTHETIST, CERTIFIED REGISTERED

## 2024-01-31 PROCEDURE — 3700000002 HC GENERAL ANESTHESIA TIME - EACH INCREMENTAL 1 MINUTE: Performed by: NURSE ANESTHETIST, CERTIFIED REGISTERED

## 2024-01-31 PROCEDURE — 45380 COLONOSCOPY AND BIOPSY: CPT | Performed by: SURGERY

## 2024-01-31 PROCEDURE — 3700000001 HC GENERAL ANESTHESIA TIME - INITIAL BASE CHARGE: Performed by: NURSE ANESTHETIST, CERTIFIED REGISTERED

## 2024-01-31 PROCEDURE — 7100000010 HC PHASE TWO TIME - EACH INCREMENTAL 1 MINUTE: Performed by: NURSE ANESTHETIST, CERTIFIED REGISTERED

## 2024-01-31 PROCEDURE — 88305 TISSUE EXAM BY PATHOLOGIST: CPT | Mod: TC,SUR,GEALAB | Performed by: SURGERY

## 2024-01-31 PROCEDURE — 2500000004 HC RX 250 GENERAL PHARMACY W/ HCPCS (ALT 636 FOR OP/ED): Performed by: NURSE ANESTHETIST, CERTIFIED REGISTERED

## 2024-01-31 PROCEDURE — 3600000002 HC OR TIME - INITIAL BASE CHARGE - PROCEDURE LEVEL TWO: Performed by: NURSE ANESTHETIST, CERTIFIED REGISTERED

## 2024-01-31 PROCEDURE — A45380 PR COLONOSCOPY,BIOPSY: Performed by: NURSE ANESTHETIST, CERTIFIED REGISTERED

## 2024-01-31 PROCEDURE — 88305 TISSUE EXAM BY PATHOLOGIST: CPT | Performed by: STUDENT IN AN ORGANIZED HEALTH CARE EDUCATION/TRAINING PROGRAM

## 2024-01-31 PROCEDURE — 43239 EGD BIOPSY SINGLE/MULTIPLE: CPT | Performed by: SURGERY

## 2024-01-31 PROCEDURE — 7100000009 HC PHASE TWO TIME - INITIAL BASE CHARGE: Performed by: NURSE ANESTHETIST, CERTIFIED REGISTERED

## 2024-01-31 RX ORDER — LIDOCAINE HCL/PF 100 MG/5ML
SYRINGE (ML) INTRAVENOUS AS NEEDED
Status: DISCONTINUED | OUTPATIENT
Start: 2024-01-31 | End: 2024-01-31

## 2024-01-31 RX ORDER — SODIUM CHLORIDE, SODIUM LACTATE, POTASSIUM CHLORIDE, CALCIUM CHLORIDE 600; 310; 30; 20 MG/100ML; MG/100ML; MG/100ML; MG/100ML
100 INJECTION, SOLUTION INTRAVENOUS CONTINUOUS
Status: DISCONTINUED | OUTPATIENT
Start: 2024-01-31 | End: 2024-02-01 | Stop reason: HOSPADM

## 2024-01-31 RX ORDER — PROPOFOL 10 MG/ML
INJECTION, EMULSION INTRAVENOUS CONTINUOUS PRN
Status: DISCONTINUED | OUTPATIENT
Start: 2024-01-31 | End: 2024-01-31

## 2024-01-31 RX ORDER — FENTANYL CITRATE 50 UG/ML
INJECTION, SOLUTION INTRAMUSCULAR; INTRAVENOUS AS NEEDED
Status: DISCONTINUED | OUTPATIENT
Start: 2024-01-31 | End: 2024-01-31

## 2024-01-31 RX ORDER — DEXAMETHASONE SODIUM PHOSPHATE 4 MG/ML
INJECTION, SOLUTION INTRA-ARTICULAR; INTRALESIONAL; INTRAMUSCULAR; INTRAVENOUS; SOFT TISSUE AS NEEDED
Status: DISCONTINUED | OUTPATIENT
Start: 2024-01-31 | End: 2024-01-31

## 2024-01-31 RX ORDER — ONDANSETRON HYDROCHLORIDE 2 MG/ML
INJECTION, SOLUTION INTRAVENOUS AS NEEDED
Status: DISCONTINUED | OUTPATIENT
Start: 2024-01-31 | End: 2024-01-31

## 2024-01-31 RX ORDER — FENTANYL CITRATE 50 UG/ML
INJECTION, SOLUTION INTRAMUSCULAR; INTRAVENOUS CONTINUOUS PRN
Status: DISCONTINUED | OUTPATIENT
Start: 2024-01-31 | End: 2024-01-31

## 2024-01-31 RX ADMIN — SODIUM CHLORIDE, POTASSIUM CHLORIDE, SODIUM LACTATE AND CALCIUM CHLORIDE 100 ML/HR: 600; 310; 30; 20 INJECTION, SOLUTION INTRAVENOUS at 09:40

## 2024-01-31 RX ADMIN — FENTANYL CITRATE 100 MCG: 50 INJECTION, SOLUTION INTRAMUSCULAR; INTRAVENOUS at 10:38

## 2024-01-31 RX ADMIN — DEXAMETHASONE SODIUM PHOSPHATE 8 MG: 4 INJECTION INTRA-ARTICULAR; INTRALESIONAL; INTRAMUSCULAR; INTRAVENOUS; SOFT TISSUE at 10:44

## 2024-01-31 RX ADMIN — PROPOFOL 50 MCG/KG/MIN: 10 INJECTION, EMULSION INTRAVENOUS at 10:38

## 2024-01-31 RX ADMIN — ONDANSETRON 4 MG: 2 INJECTION, SOLUTION INTRAMUSCULAR; INTRAVENOUS at 10:44

## 2024-01-31 RX ADMIN — SODIUM CHLORIDE, POTASSIUM CHLORIDE, SODIUM LACTATE AND CALCIUM CHLORIDE: 600; 310; 30; 20 INJECTION, SOLUTION INTRAVENOUS at 11:11

## 2024-01-31 RX ADMIN — LIDOCAINE HYDROCHLORIDE 100 MG: 20 INJECTION, SOLUTION INTRAVENOUS at 10:36

## 2024-01-31 SDOH — HEALTH STABILITY: MENTAL HEALTH: CURRENT SMOKER: 0

## 2024-01-31 ASSESSMENT — PAIN SCALES - GENERAL
PAIN_LEVEL: 2
PAINLEVEL_OUTOF10: 0 - NO PAIN
PAINLEVEL_OUTOF10: 0 - NO PAIN

## 2024-01-31 ASSESSMENT — PAIN - FUNCTIONAL ASSESSMENT: PAIN_FUNCTIONAL_ASSESSMENT: 0-10

## 2024-01-31 NOTE — ANESTHESIA PREPROCEDURE EVALUATION
Patient: Maxwell Villatoro    Procedure Information       Date/Time: 01/31/24 1030    Scheduled providers: Alicia Wilson MD    Procedures:       EGD      COLONOSCOPY    Location: [facility] OR            Relevant Problems   Cardiovascular   (+) Coronary atherosclerosis      Endocrine   (+) Acquired autoimmune hypothyroidism      GI   (+) Gastroesophageal reflux disease with esophagitis      Neuro/Psych   (+) Lumbar radiculitis      Pulmonary   (+) Dyspnea on exertion   (+) Lung nodules   (+) Obstructive sleep apnea   (+) Shortness of breath on exertion   (+) Squamous cell carcinoma of lung, right (CMS/HCC)      Musculoskeletal   (+) Lumbar herniated disc       Clinical information reviewed:   Tobacco  Allergies  Meds   Med Hx  Surg Hx   Fam Hx  Soc Hx        NPO Detail:  NPO/Void Status  Date of Last Liquid: 01/30/24  Time of Last Liquid: 2000  Date of Last Solid: 01/29/24  Time of Last Void: 0700         Physical Exam    Airway  Mallampati: II  TM distance: >3 FB  Neck ROM: full     Cardiovascular   Rhythm: regular     Dental   (+) lower dentures, upper dentures     Pulmonary   Breath sounds clear to auscultation     Abdominal   Abdomen: soft           Anesthesia Plan    History of general anesthesia?: yes  History of complications of general anesthesia?: no    ASA 3     MAC     The patient is not a current smoker.    intravenous induction   Anesthetic plan and risks discussed with patient.  Use of blood products discussed with patient who consented to blood products.    Plan discussed with CRNA.

## 2024-01-31 NOTE — DISCHARGE INSTRUCTIONS
Patient Instructions after a Colonoscopy      The anesthetics, sedatives or narcotics which were given to you today will be acting in your body for the next 24 hours, so you might feel a little sleepy or groggy.  This feeling should slowly wear off. Carefully read and follow the instructions.     You received sedation today:  - Do not drive or operate any machinery or power tools of any kind.   - No alcoholic beverages today, not even beer or wine.  - Do not make any important decisions or sign any legal documents.  - No over the counter medications that contain alcohol or that may cause drowsiness.  - Do not make any important decisions or sign any legal documents.    While it is common to experience mild to moderate abdominal distention, gas, or belching after your procedure, if any of these symptoms occur following discharge from the GI Lab or within one week of having your procedure, call the [facility] to be advised whether a visit to your nearest Urgent Care or Emergency Department is indicated.  Take this paper with you if you go.     - If you develop an allergic reaction to the medications that were given during your procedure such as difficulty breathing, rash, hives, severe nausea, vomiting or lightheadedness.  - If you experience chest pain, shortness of breath, severe abdominal pain, fevers and chills.  -If you develop signs and symptoms of bleeding such as blood in your spit, if your stools turn black, tarry, or bloody  - If you have not urinated within 8 hours following your procedure.  - If your IV site becomes painful, red, inflamed, or looks infected.    If you received a biopsy/polypectomy/sphincterotomy the following instructions apply below:    __ Do not use Aspirin containing products, non-steroidal medications or anti-coagulants for one week following your procedure. (Examples of these types of medications are: Advil, Arthrotec, Aleve, Coumadin, Ecotrin, Heparin, Ibuprofen,  Indocin, Motrin, Naprosyn, Nuprin, Plavix, Vioxx, and Voltarin, or their generic forms.  This list is not all-inclusive.  Check with your physician or pharmacist before resuming medications.)   __ Eat a soft diet today.  Avoid foods that are poorly digested for the next 24 hours.  These foods would include: nuts, beans, lettuce, red meats, and fried foods. Start with liquids and advance your diet as tolerated, gradually work up to eating solids.   __ Do not have a Barium Study or Enema for one week.    Your physician recommends the additional following instructions:    -You have a contact number available for emergencies. The signs and symptoms of potential delayed complications were discussed with you. You may return to normal activities tomorrow.  -Resume your previous diet.  -Continue your present medications.   -We are waiting for your pathology results.  -Your physician has recommended a repeat colonoscopy (date to be determined after pending pathology results are reviewed) for surveillance based on pathology results.  -The findings and recommendations have been discussed with you.  -The findings and recommendations were discussed with your family.  - Please see Medication Reconciliation Form for new medication/medications prescribed.       If you experience any problems or have any questions following discharge from the GI Lab, please call:        Nurse Signature                                                                        Date___________________                                                                            Patient/Responsible Party Signature                                        Date___________________

## 2024-01-31 NOTE — ANESTHESIA POSTPROCEDURE EVALUATION
Patient: Maxwell Villatoro    Procedure Summary       Date: 01/31/24 Room / Location: [facility] OR    Anesthesia Start: 1028 Anesthesia Stop: 1123    Procedures:       EGD      COLONOSCOPY Diagnosis: Generalized abdominal pain    Scheduled Providers: Alicia Wilson MD Responsible Provider: JOANNE Nicole    Anesthesia Type: MAC ASA Status: 3            Anesthesia Type: MAC    Vitals Value Taken Time   /77 01/31/24 1131   Temp 36 °C (96.8 °F) 01/31/24 1116   Pulse 76 01/31/24 1131   Resp 18 01/31/24 1131   SpO2 97 % 01/31/24 1131       Anesthesia Post Evaluation    Patient location during evaluation: PACU  Patient participation: complete - patient participated  Level of consciousness: awake and alert  Pain score: 2  Pain management: adequate  Multimodal analgesia pain management approach  Airway patency: patent  Two or more strategies used to mitigate risk of obstructive sleep apnea  Cardiovascular status: acceptable  Respiratory status: acceptable  Hydration status: stable  Postoperative Nausea and Vomiting: none        No notable events documented.

## 2024-02-07 LAB
LAB AP ASR DISCLAIMER: NORMAL
LABORATORY COMMENT REPORT: NORMAL
PATH REPORT.COMMENTS IMP SPEC: NORMAL
PATH REPORT.FINAL DX SPEC: NORMAL
PATH REPORT.GROSS SPEC: NORMAL
PATH REPORT.TOTAL CANCER: NORMAL

## 2024-02-10 DIAGNOSIS — R10.13 ABDOMINAL PAIN, ACUTE, EPIGASTRIC: ICD-10-CM

## 2024-02-12 PROBLEM — E66.9 OBESITY: Status: ACTIVE | Noted: 2024-02-12

## 2024-02-12 PROBLEM — M51.26 HERNIATED LUMBAR INTERVERTEBRAL DISC: Status: ACTIVE | Noted: 2023-03-07

## 2024-02-12 PROBLEM — Z86.39 HISTORY OF ELEVATED LIPIDS: Status: ACTIVE | Noted: 2024-02-12

## 2024-02-12 RX ORDER — MECLIZINE HYDROCHLORIDE 25 MG/1
TABLET ORAL
COMMUNITY
Start: 2020-07-10 | End: 2024-04-05 | Stop reason: ALTCHOICE

## 2024-02-12 RX ORDER — PANTOPRAZOLE SODIUM 40 MG/1
40 TABLET, DELAYED RELEASE ORAL NIGHTLY
Qty: 90 TABLET | Refills: 1 | Status: SHIPPED | OUTPATIENT
Start: 2024-02-12 | End: 2024-06-03 | Stop reason: SDUPTHER

## 2024-02-13 ENCOUNTER — LAB (OUTPATIENT)
Dept: LAB | Facility: LAB | Age: 79
End: 2024-02-13
Payer: MEDICARE

## 2024-02-13 ENCOUNTER — HOSPITAL ENCOUNTER (OUTPATIENT)
Dept: RADIOLOGY | Facility: CLINIC | Age: 79
Discharge: HOME | End: 2024-02-13
Payer: MEDICARE

## 2024-02-13 DIAGNOSIS — R10.84 GENERALIZED ABDOMINAL PAIN: ICD-10-CM

## 2024-02-13 DIAGNOSIS — N20.0 CALCULUS OF KIDNEY: ICD-10-CM

## 2024-02-13 DIAGNOSIS — R97.20 ELEVATED PROSTATE SPECIFIC ANTIGEN (PSA): Primary | ICD-10-CM

## 2024-02-13 LAB
CREAT SERPL-MCNC: 1.04 MG/DL (ref 0.5–1.3)
EGFRCR SERPLBLD CKD-EPI 2021: 73 ML/MIN/1.73M*2

## 2024-02-13 PROCEDURE — 84153 ASSAY OF PSA TOTAL: CPT

## 2024-02-13 PROCEDURE — 74018 RADEX ABDOMEN 1 VIEW: CPT

## 2024-02-13 PROCEDURE — 82565 ASSAY OF CREATININE: CPT

## 2024-02-13 PROCEDURE — 36415 COLL VENOUS BLD VENIPUNCTURE: CPT

## 2024-02-14 LAB — PSA SERPL-MCNC: 6.59 NG/ML

## 2024-02-29 ENCOUNTER — TELEPHONE (OUTPATIENT)
Dept: PRIMARY CARE | Facility: CLINIC | Age: 79
End: 2024-02-29
Payer: MEDICARE

## 2024-02-29 DIAGNOSIS — E06.3 ACQUIRED AUTOIMMUNE HYPOTHYROIDISM: ICD-10-CM

## 2024-02-29 NOTE — TELEPHONE ENCOUNTER
Pharmacy:[facility]   Medication(s): levothyroxine   Dose: 50 mcg take one tab Mon-Sat and two tabs on Sun  Qty: 98  Last Office Visit: 12/01/2023  Next Office Visit: 06/03/2024

## 2024-03-04 RX ORDER — LEVOTHYROXINE SODIUM 50 UG/1
50 TABLET ORAL NIGHTLY
Qty: 90 TABLET | Refills: 1 | Status: SHIPPED | OUTPATIENT
Start: 2024-03-04 | End: 2024-06-03 | Stop reason: SDUPTHER

## 2024-04-03 RX ORDER — FINASTERIDE 5 MG/1
5 TABLET, FILM COATED ORAL DAILY
COMMUNITY
Start: 2024-02-13

## 2024-04-05 ENCOUNTER — PRE-ADMISSION TESTING (OUTPATIENT)
Dept: PREADMISSION TESTING | Facility: HOSPITAL | Age: 79
End: 2024-04-05
Payer: MEDICARE

## 2024-04-05 VITALS
WEIGHT: 200.4 LBS | BODY MASS INDEX: 30.37 KG/M2 | SYSTOLIC BLOOD PRESSURE: 149 MMHG | OXYGEN SATURATION: 98 % | HEIGHT: 68 IN | DIASTOLIC BLOOD PRESSURE: 85 MMHG | TEMPERATURE: 97.9 F | RESPIRATION RATE: 18 BRPM | HEART RATE: 68 BPM

## 2024-04-05 DIAGNOSIS — N20.0 NEPHROLITHIASIS: Primary | ICD-10-CM

## 2024-04-05 LAB
ALBUMIN SERPL BCP-MCNC: 4.1 G/DL (ref 3.4–5)
ALP SERPL-CCNC: 73 U/L (ref 33–136)
ALT SERPL W P-5'-P-CCNC: 23 U/L (ref 10–52)
ANION GAP SERPL CALC-SCNC: 16 MMOL/L (ref 10–20)
APPEARANCE UR: ABNORMAL
AST SERPL W P-5'-P-CCNC: 24 U/L (ref 9–39)
BACTERIA #/AREA URNS AUTO: ABNORMAL /HPF
BASOPHILS # BLD AUTO: 0.08 X10*3/UL (ref 0–0.1)
BASOPHILS NFR BLD AUTO: 1.2 %
BILIRUB SERPL-MCNC: 0.6 MG/DL (ref 0–1.2)
BILIRUB UR STRIP.AUTO-MCNC: NEGATIVE MG/DL
BUN SERPL-MCNC: 15 MG/DL (ref 6–23)
CALCIUM SERPL-MCNC: 9 MG/DL (ref 8.6–10.3)
CHLORIDE SERPL-SCNC: 101 MMOL/L (ref 98–107)
CO2 SERPL-SCNC: 25 MMOL/L (ref 21–32)
COLOR UR: YELLOW
CREAT SERPL-MCNC: 1.04 MG/DL (ref 0.5–1.3)
EGFRCR SERPLBLD CKD-EPI 2021: 73 ML/MIN/1.73M*2
EOSINOPHIL # BLD AUTO: 0.25 X10*3/UL (ref 0–0.4)
EOSINOPHIL NFR BLD AUTO: 3.9 %
ERYTHROCYTE [DISTWIDTH] IN BLOOD BY AUTOMATED COUNT: 13.2 % (ref 11.5–14.5)
GLUCOSE SERPL-MCNC: 83 MG/DL (ref 74–99)
GLUCOSE UR STRIP.AUTO-MCNC: NEGATIVE MG/DL
HCT VFR BLD AUTO: 46.9 % (ref 41–52)
HGB BLD-MCNC: 15.5 G/DL (ref 13.5–17.5)
IMM GRANULOCYTES # BLD AUTO: 0.02 X10*3/UL (ref 0–0.5)
IMM GRANULOCYTES NFR BLD AUTO: 0.3 % (ref 0–0.9)
KETONES UR STRIP.AUTO-MCNC: NEGATIVE MG/DL
LEUKOCYTE ESTERASE UR QL STRIP.AUTO: ABNORMAL
LYMPHOCYTES # BLD AUTO: 1.55 X10*3/UL (ref 0.8–3)
LYMPHOCYTES NFR BLD AUTO: 24 %
MCH RBC QN AUTO: 28.8 PG (ref 26–34)
MCHC RBC AUTO-ENTMCNC: 33 G/DL (ref 32–36)
MCV RBC AUTO: 87 FL (ref 80–100)
MONOCYTES # BLD AUTO: 0.37 X10*3/UL (ref 0.05–0.8)
MONOCYTES NFR BLD AUTO: 5.7 %
NEUTROPHILS # BLD AUTO: 4.18 X10*3/UL (ref 1.6–5.5)
NEUTROPHILS NFR BLD AUTO: 64.9 %
NITRITE UR QL STRIP.AUTO: POSITIVE
NRBC BLD-RTO: 0 /100 WBCS (ref 0–0)
PH UR STRIP.AUTO: 7 [PH]
PLATELET # BLD AUTO: 163 X10*3/UL (ref 150–450)
POTASSIUM SERPL-SCNC: 4 MMOL/L (ref 3.5–5.3)
PROT SERPL-MCNC: 6.5 G/DL (ref 6.4–8.2)
PROT UR STRIP.AUTO-MCNC: NEGATIVE MG/DL
RBC # BLD AUTO: 5.39 X10*6/UL (ref 4.5–5.9)
RBC # UR STRIP.AUTO: ABNORMAL /UL
RBC #/AREA URNS AUTO: >20 /HPF
SODIUM SERPL-SCNC: 138 MMOL/L (ref 136–145)
SP GR UR STRIP.AUTO: 1.01
UROBILINOGEN UR STRIP.AUTO-MCNC: <2 MG/DL
WBC # BLD AUTO: 6.5 X10*3/UL (ref 4.4–11.3)
WBC #/AREA URNS AUTO: ABNORMAL /HPF

## 2024-04-05 PROCEDURE — 80053 COMPREHEN METABOLIC PANEL: CPT

## 2024-04-05 PROCEDURE — 81001 URINALYSIS AUTO W/SCOPE: CPT

## 2024-04-05 PROCEDURE — 99204 OFFICE O/P NEW MOD 45 MIN: CPT | Performed by: NURSE PRACTITIONER

## 2024-04-05 PROCEDURE — 87086 URINE CULTURE/COLONY COUNT: CPT | Mod: GEALAB

## 2024-04-05 PROCEDURE — 85025 COMPLETE CBC W/AUTO DIFF WBC: CPT

## 2024-04-05 PROCEDURE — 36415 COLL VENOUS BLD VENIPUNCTURE: CPT

## 2024-04-05 ASSESSMENT — DUKE ACTIVITY SCORE INDEX (DASI)
CAN YOU DO MODERATE WORK AROUND THE HOUSE LIKE VACUUMING, SWEEPING FLOORS OR CARRYING GROCERIES: YES
CAN YOU DO HEAVY WORK AROUND THE HOUSE LIKE SCRUBBING FLOORS OR LIFTING AND MOVING HEAVY FURNITURE: YES
CAN YOU RUN A SHORT DISTANCE: YES
CAN YOU HAVE SEXUAL RELATIONS: YES
CAN YOU DO YARD WORK LIKE RAKING LEAVES, WEEDING OR PUSHING A MOWER: YES
CAN YOU PARTICIPATE IN STRENOUS SPORTS LIKE SWIMMING, SINGLES TENNIS, FOOTBALL, BASKETBALL, OR SKIING: YES
CAN YOU PARTICIPATE IN MODERATE RECREATIONAL ACTIVITIES LIKE GOLF, BOWLING, DANCING, DOUBLES TENNIS OR THROWING A BASEBALL OR FOOTBALL: YES
TOTAL_SCORE: 58.2
DASI METS SCORE: 9.9
CAN YOU CLIMB A FLIGHT OF STAIRS OR WALK UP A HILL: YES
CAN YOU WALK A BLOCK OR TWO ON LEVEL GROUND: YES
CAN YOU WALK INDOORS, SUCH AS AROUND YOUR HOUSE: YES
CAN YOU TAKE CARE OF YOURSELF (EAT, DRESS, BATHE, OR USE TOILET): YES
CAN YOU DO LIGHT WORK AROUND THE HOUSE LIKE DUSTING OR WASHING DISHES: YES

## 2024-04-05 ASSESSMENT — ENCOUNTER SYMPTOMS
NEUROLOGICAL NEGATIVE: 1
MUSCULOSKELETAL NEGATIVE: 1
GASTROINTESTINAL NEGATIVE: 1
CARDIOVASCULAR NEGATIVE: 1
DIFFICULTY URINATING: 1
RESPIRATORY NEGATIVE: 1
CONSTITUTIONAL NEGATIVE: 1
NECK NEGATIVE: 1
ENDOCRINE NEGATIVE: 1

## 2024-04-05 ASSESSMENT — CHADS2 SCORE
CHADS2 SCORE: 2
AGE GREATER THAN OR EQUAL TO 75: YES
DIABETES: NO
HYPERTENSION: YES
PRIOR STROKE OR TIA OR THROMBOEMBOLISM: NO
CHF: NO

## 2024-04-05 ASSESSMENT — PAIN SCALES - GENERAL: PAINLEVEL_OUTOF10: 0 - NO PAIN

## 2024-04-05 ASSESSMENT — LIFESTYLE VARIABLES: SMOKING_STATUS: NONSMOKER

## 2024-04-05 ASSESSMENT — PAIN - FUNCTIONAL ASSESSMENT: PAIN_FUNCTIONAL_ASSESSMENT: 0-10

## 2024-04-05 NOTE — H&P (VIEW-ONLY)
CPM/PAT Evaluation       Name: Maxwell Villatoro (Maxwell Villatoro)  /Age: 1945/78 y.o.     Visit Type:   In-Person       Chief Complaint: Domitila Riggs    HPI  Pt is a 78 year old male with a PMHx significant for HLD, CAD, KAILA, intermittent SOB, GERD, BPH, anxiety, depression, Page's esophagus, lung cancer s/p wedge resection and nephrolithiasis.  Pt is being evaluated in [facility] in anticipation of a Right Lithotripsy with Dr. Blum on .  Past Medical History:   Diagnosis Date    Anxiety     Arthritis     Page's esophagus     BPH with obstruction/lower urinary tract symptoms     BPPV (benign paroxysmal positional vertigo)     Class 1 obesity with body mass index (BMI) of 30.0 to 30.9 in adult 2024    30.77 kg/m²    Coronary atherosclerosis     Depression     SHANNON (dyspnea on exertion)     Eczema     Edentulous     Full dentures     Generalized abdominal pain     GERD (gastroesophageal reflux disease)     Hematuria syndrome 2023    HLD (hyperlipidemia)     Hypothyroid     Acquired autoimmune hypothyroidism    Inguinal hernia recurrent bilateral     Kidney stone     Lumbar herniated disc     Lung cancer (CMS/HCC)     Squamous cell carcinoma of lung, right    Nephrolithiasis     OA (osteoarthritis) of knee     Bilateral    Obesity     Personal history of other endocrine, nutritional and metabolic disease 2015    History of hyperlipidemia    PSA elevation     Sleep apnea     no CPAP    Synovial cyst of left knee     Umbilical hernia     Wears glasses        Past Surgical History:   Procedure Laterality Date    HERNIA REPAIR      multiple hernias    LITHOTRIPSY  2015    Renal Lithotripsy    LUNG REMOVAL, PARTIAL Right     dr ram       Patient Sexual activity questions deferred to the physician.    Family History   Family history unknown: Yes       No Known Allergies    Prior to Admission medications    Medication Sig Start Date End Date Taking? Authorizing Provider   atorvastatin  (Lipitor) 40 mg tablet Take 1 tablet (40 mg) by mouth once daily at bedtime.   Yes Historical Provider, MD   finasteride (Proscar) 5 mg tablet Take 1 tablet (5 mg) by mouth once daily. 2/13/24  Yes Historical Provider, MD   levothyroxine (Synthroid, Levoxyl) 50 mcg tablet Take 1 tablet (50 mcg) by mouth once daily at bedtime. 3/4/24  Yes Eugenioananya Mcwilliams, DO   pantoprazole (ProtoNix) 40 mg EC tablet Take 1 tablet (40 mg) by mouth once daily at bedtime. DO NOT CRUSH CHEW OR SPLIT 2/12/24  Yes Eugenioananya Mcwilliams, DO   tamsulosin (Flomax) 0.4 mg 24 hr capsule Take 1 capsule (0.4 mg) by mouth once daily at bedtime.   Yes Historical Provider, MD   meclizine (Antivert) 25 mg tablet Meclizine HCl - 25 MG Oral Tablet TAKE 1 TABLET 3 TIMES DAILY AS NEEDED. Quantity: 45 Refills: 0 Moritz MD, John Start : 10-Jul-2020 Active 7/10/20 4/5/24  Historical Provider, MD PRYOR ROS:   Constitutional:   neg    Neuro/Psych:   neg    Eyes:    use of corrective lenses  Ears:   neg    Nose:   neg    Mouth:   neg    Throat:   neg    Neck:   neg    Cardio:   neg    Respiratory:   neg    Endocrine:   neg    GI:   neg    :    difficulty urinating  Musculoskeletal:   neg    Hematologic:   neg    Skin:  neg        Physical Exam  Vitals reviewed.   HENT:      Head: Normocephalic and atraumatic.      Nose: Nose normal.      Mouth/Throat:      Mouth: Mucous membranes are moist.   Eyes:      Pupils: Pupils are equal, round, and reactive to light.   Cardiovascular:      Rate and Rhythm: Normal rate.   Pulmonary:      Effort: Pulmonary effort is normal.   Abdominal:      General: Bowel sounds are normal.   Musculoskeletal:         General: Normal range of motion.      Cervical back: Normal range of motion.   Skin:     General: Skin is warm.   Neurological:      General: No focal deficit present.      Mental Status: He is alert and oriented to person, place, and time.   Psychiatric:         Mood and Affect: Mood normal.         Behavior: Behavior  normal.          PAT AIRWAY:   Airway:     Mallampati::  II    TM distance::  >3 FB    Neck ROM::  Full   upper dentures and lower dentures      Visit Vitals  /85   Pulse 68   Temp 36.6 °C (97.9 °F)   Resp 18       DASI Risk Score      Flowsheet Row Most Recent Value   DASI SCORE 58.2   METS Score (Will be calculated only when all the questions are answered) 9.9          Caprini DVT Assessment      Flowsheet Row Most Recent Value   DVT Score 9   Current Status Major surgery planned, including arthroscopic and laproscopic (1-2 hours)   History Prior major surgery, Previous malignancy   Age Over 75 years   BMI 30 or less          Modified Frailty Index      Flowsheet Row Most Recent Value   Modified Frailty Index Calculator .0909          CHADS2 Stroke Risk  Current as of 10 minutes ago        N/A 3 - 100%: High Risk   2 - 3%: Medium Risk   0 - 2%: Low Risk     Last Change: N/A          This score determines the patient's risk of having a stroke if the patient has atrial fibrillation.        This score is not applicable to this patient. Components are not calculated.          Revised Cardiac Risk Index      Flowsheet Row Most Recent Value   Revised Cardiac Risk Calculator 0          Apfel Simplified Score      Flowsheet Row Most Recent Value   Apfel Simplified Score Calculator 2          Risk Analysis Index Results This Encounter    No data found in the last 1 encounters.       Stop Bang Score      Flowsheet Row Most Recent Value   Do you snore loudly? 1   Do you often feel tired or fatigued after your sleep? 1   Has anyone ever observed you stop breathing in your sleep? 1   Do you have or are you being treated for high blood pressure? 0   Recent BMI (Calculated) 28.8   Is BMI greater than 35 kg/m2? 0=No   Age older than 50 years old? 1=Yes   Is your neck circumference greater than 17 inches (Male) or 16 inches (Female)? 0   Gender - Male 1=Yes   STOP-BANG Total Score 5            Assessment and Plan:      Anesthesia:  The patient denies problems with anesthesia in the past such as PONV, prolonged sedation, awareness, dental damage, aspiration, cardiac arrest, difficult intubation, or unexpected hospital admissions.     Neuro:   The patient has no neurological diagnoses or significant findings on chart review, clinical presentation, and evaluation. No grossly apparent neurological perioperative risk. The patient is at increased risk for postoperative delirium secondary to age 65 or older, depression.    HEENT/Airway  No diagnoses, significant findings on chart review, clinical presentation, or evaluation.    Cardiovascular  The patient is scheduled for a low risk procedure.  Therefore, no further cardiac evaluation is indicated.  RCRI  The patient meets 0-1 RCRI criteria and therefore has a less than 1% risk of major adverse cardiac complications.  METS  The patient's functional capacity capacity is greater than 4 METS.  EKG  The patient has no EKG or echocardiographic changes concerning for myocardial ischemia.   Heart Failure  The patient has no known history of heart failure.  Additionally, the patient reports no symptoms of heart failure and demonstrates no signs of heart failure.  Hypertension Evaluation  The patient has no known history of hypertension and has a normal blood pressure today.  Heart Rhythm Evaluation  The patient has no history of arrhythmias.  Heart Valve Evaluation  The patient has no known history of valvular heart disease. The patient has no symptoms or physical exam findings to suggest valvular heart disease.  Cardiology Evaluation  The patient follows with cardiology, Dr. Multani. Patient was last seen 3-14-24. Per note, office called note is not signed and can't be released.    The patient has a 30-day risk for MACE of 1 predictor, 6.0% risk for cardiac death, nonfatal myocardial infarction, and nonfatal cardiac arrest.  PEDRO LUIS score which indicates a 0.1% risk of intraoperative or 30-day  postoperative.    Pulmonary   The patient has findings on chart review, clinical presentation and evaluation significant for KAILA. The patient is at increased risk of perioperative pulmonary complications secondary to advanced age greater than 60.    The patient has a stop bang score of 5, which places patient at intermediate risk for having KAILA.    ARISCAT 19, low, 1.6% risk of in-hospital postoperative pulmonary complications  PRODIGY 25, high risk of respiratory depression episode. Patient given PI sheet for preoperative deep breathing exercises.    Hematology  No diagnoses or significant findings on chart review or clinical presentation and evaluation.  Antiplatelet management   The patient is not currently receiving antiplatelet therapy.  Anticoagulation management  The patient is not currently receiving anticoagulation therapy.    Caprini score 9, high risk of perioperative VTE.     Patient instructed to ambulate as soon as possible postoperatively to decrease thromboembolic risk. Initiate mechanical DVT prophylaxis as soon as possible and initiate chemical prophylaxis when deemed safe from a bleeding standpoint post surgery.     Transfusion Evaluation  Not ordered  Gastrointestinal  The patient has diagnoses or significant findings on chart review or clinical presentation and evaluation significant for GERD.  Eat 10- 0,  self-perceived oropharyngeal dysphagia scale (0-40)     Genitourinary  The patient has diagnoses or significant findings on chart review or clinical presentation and evaluation significant for nephrolithiasis    Renal  The patient has specific risk factors associated with increased risk of perioperative renal complications due to age greater than 55, male gender.    Musculoskeletal  The patient has diagnoses or significant findings on chart review or clinical presentation and evaluation significant for osteoarthritis    Endocrine  Diabetes Evaluation  The patient has no history of diabetes  mellitus.  Thyroid Disease Evaluation  The patient has no history of thyroid disease.    ID  No diagnoses or significant findings on chart review or clinical presentation and evaluation.    -Preoperative medication instructions were provided and reviewed with the patient.  Any additional testing or evaluation was explained to the patient.  NPO Instructions were discussed, and the patient's questions were answered prior to conclusion of this encounter.       Recent Results (from the past 168 hour(s))   CBC and Auto Differential    Collection Time: 04/05/24  3:10 PM   Result Value Ref Range    WBC 6.5 4.4 - 11.3 x10*3/uL    nRBC 0.0 0.0 - 0.0 /100 WBCs    RBC 5.39 4.50 - 5.90 x10*6/uL    Hemoglobin 15.5 13.5 - 17.5 g/dL    Hematocrit 46.9 41.0 - 52.0 %    MCV 87 80 - 100 fL    MCH 28.8 26.0 - 34.0 pg    MCHC 33.0 32.0 - 36.0 g/dL    RDW 13.2 11.5 - 14.5 %    Platelets 163 150 - 450 x10*3/uL    Neutrophils % 64.9 40.0 - 80.0 %    Immature Granulocytes %, Automated 0.3 0.0 - 0.9 %    Lymphocytes % 24.0 13.0 - 44.0 %    Monocytes % 5.7 2.0 - 10.0 %    Eosinophils % 3.9 0.0 - 6.0 %    Basophils % 1.2 0.0 - 2.0 %    Neutrophils Absolute 4.18 1.60 - 5.50 x10*3/uL    Immature Granulocytes Absolute, Automated 0.02 0.00 - 0.50 x10*3/uL    Lymphocytes Absolute 1.55 0.80 - 3.00 x10*3/uL    Monocytes Absolute 0.37 0.05 - 0.80 x10*3/uL    Eosinophils Absolute 0.25 0.00 - 0.40 x10*3/uL    Basophils Absolute 0.08 0.00 - 0.10 x10*3/uL   Comprehensive Metabolic Panel    Collection Time: 04/05/24  3:10 PM   Result Value Ref Range    Glucose 83 74 - 99 mg/dL    Sodium 138 136 - 145 mmol/L    Potassium 4.0 3.5 - 5.3 mmol/L    Chloride 101 98 - 107 mmol/L    Bicarbonate 25 21 - 32 mmol/L    Anion Gap 16 10 - 20 mmol/L    Urea Nitrogen 15 6 - 23 mg/dL    Creatinine 1.04 0.50 - 1.30 mg/dL    eGFR 73 >60 mL/min/1.73m*2    Calcium 9.0 8.6 - 10.3 mg/dL    Albumin 4.1 3.4 - 5.0 g/dL    Alkaline Phosphatase 73 33 - 136 U/L    Total Protein 6.5  6.4 - 8.2 g/dL    AST 24 9 - 39 U/L    Bilirubin, Total 0.6 0.0 - 1.2 mg/dL    ALT 23 10 - 52 U/L   Urinalysis with Reflex Culture and Microscopic    Collection Time: 04/05/24  3:11 PM   Result Value Ref Range    Color, Urine Yellow Straw, Yellow    Appearance, Urine Hazy (N) Clear    Specific Gravity, Urine 1.015 1.005 - 1.035    pH, Urine 7.0 5.0, 5.5, 6.0, 6.5, 7.0, 7.5, 8.0    Protein, Urine NEGATIVE NEGATIVE mg/dL    Glucose, Urine NEGATIVE NEGATIVE mg/dL    Blood, Urine SMALL (1+) (A) NEGATIVE    Ketones, Urine NEGATIVE NEGATIVE mg/dL    Bilirubin, Urine NEGATIVE NEGATIVE    Urobilinogen, Urine <2.0 <2.0 mg/dL    Nitrite, Urine POSITIVE (A) NEGATIVE    Leukocyte Esterase, Urine MODERATE (2+) (A) NEGATIVE   Extra Urine Gray Tube    Collection Time: 04/05/24  3:11 PM   Result Value Ref Range    Extra Tube Hold for add-ons.    Microscopic Only, Urine    Collection Time: 04/05/24  3:11 PM   Result Value Ref Range    WBC, Urine 21-50 (A) 1-5, NONE /HPF    RBC, Urine >20 (A) NONE, 1-2, 3-5 /HPF    Bacteria, Urine 1+ (A) NONE SEEN /HPF   Urine Culture    Collection Time: 04/05/24  3:11 PM    Specimen: Clean Catch/Voided; Urine   Result Value Ref Range    Urine Culture Normal genitourinary josué

## 2024-04-05 NOTE — PREPROCEDURE INSTRUCTIONS
Medication List            Accurate as of April 5, 2024  2:20 PM. Always use your most recent med list.                atorvastatin 40 mg tablet  Commonly known as: Lipitor  Medication Adjustments for Surgery: Take morning of surgery with sip of water, no other fluids     finasteride 5 mg tablet  Commonly known as: Proscar  Medication Adjustments for Surgery: Take morning of surgery with sip of water, no other fluids     levothyroxine 50 mcg tablet  Commonly known as: Synthroid, Levoxyl  Take 1 tablet (50 mcg) by mouth once daily at bedtime.  Medication Adjustments for Surgery: Take morning of surgery with sip of water, no other fluids     pantoprazole 40 mg EC tablet  Commonly known as: ProtoNix  Take 1 tablet (40 mg) by mouth once daily at bedtime. DO NOT CRUSH CHEW OR SPLIT  Medication Adjustments for Surgery: Take morning of surgery with sip of water, no other fluids     tamsulosin 0.4 mg 24 hr capsule  Commonly known as: Flomax  Medication Adjustments for Surgery: Take morning of surgery with sip of water, no other fluids              SURGERY PRE-OPERATIVE INSTRUCTIONS    *You will receive a phone call the day before your procedure  after 2pm, (or the Friday before your surgery if scheduled on a Monday.) Generally the hospital will be calling you with this information after that time.    *You are not to eat after midnight the night before the surgery. You may have 8oz of a clear liquid up until 2 hours prior to arriving to the hospital. The exception is with medications you were instructed to take day of surgery.    *You may take tylenol for pain/discomfort as needed.     *Stop taking all aspirin products, ibuprofen (motrin/advil), naproxen (aleve/naprosyn) for one week prior to surgery.    *Stop taking all vitamins and supplements one week prior to surgery.     *You should not have alcoholic beverages for 24 hours before surgery.     *You should not smoke 24 hours prior to surgery.     *To help prevent  surgical infections bathe/shower with Dial soap the evening before surgery.    *You can wear deodorant but no lotion, powder, or perfume/cologne. You should remove all make-up and nail polish at home.    *If you wear glasses, please bring a case for the glasses with you.    *You will be asked to remove dentures and contacts.     *Please leave all valuables at home.    *You should wear loose, comfortable clothing that will accommodate bandages and/or casts.    *You should notify your doctor of any change in your condition (fever, cold, rash, etc). Surgery may need to be re-scheduled until a time you are in better health.    *A responsible adult is required to accompany you to and from the hospital if you are receiving anesthesia or a sedative. Patients are not permitted to drive for 24 hours after anesthesia.     *You can use the [place] parking if you wish.     *If you have any further questions please call [facility] [phone].                  NPO Instructions:        Additional Instructions:

## 2024-04-06 LAB — HOLD SPECIMEN: NORMAL

## 2024-04-07 LAB — BACTERIA UR CULT: NORMAL

## 2024-04-08 ENCOUNTER — ANESTHESIA EVENT (OUTPATIENT)
Dept: OPERATING ROOM | Facility: HOSPITAL | Age: 79
End: 2024-04-08
Payer: MEDICARE

## 2024-04-10 ENCOUNTER — ANESTHESIA (OUTPATIENT)
Dept: OPERATING ROOM | Facility: HOSPITAL | Age: 79
End: 2024-04-10
Payer: MEDICARE

## 2024-04-10 ENCOUNTER — PHARMACY VISIT (OUTPATIENT)
Dept: PHARMACY | Facility: CLINIC | Age: 79
End: 2024-04-10
Payer: COMMERCIAL

## 2024-04-10 ENCOUNTER — HOSPITAL ENCOUNTER (OUTPATIENT)
Facility: HOSPITAL | Age: 79
Setting detail: OUTPATIENT SURGERY
Discharge: HOME | End: 2024-04-10
Attending: UROLOGY | Admitting: UROLOGY
Payer: MEDICARE

## 2024-04-10 VITALS
OXYGEN SATURATION: 96 % | SYSTOLIC BLOOD PRESSURE: 109 MMHG | DIASTOLIC BLOOD PRESSURE: 72 MMHG | TEMPERATURE: 98.1 F | RESPIRATION RATE: 16 BRPM | HEART RATE: 73 BPM

## 2024-04-10 DIAGNOSIS — N20.0 KIDNEY STONE: ICD-10-CM

## 2024-04-10 DIAGNOSIS — N20.0 CALCULUS OF KIDNEY: Primary | ICD-10-CM

## 2024-04-10 PROCEDURE — 7100000002 HC RECOVERY ROOM TIME - EACH INCREMENTAL 1 MINUTE: Performed by: UROLOGY

## 2024-04-10 PROCEDURE — 2500000004 HC RX 250 GENERAL PHARMACY W/ HCPCS (ALT 636 FOR OP/ED): Performed by: NURSE ANESTHETIST, CERTIFIED REGISTERED

## 2024-04-10 PROCEDURE — A50590 PR FRAGMENT KIDNEY STONE/ ESWL: Performed by: NURSE ANESTHETIST, CERTIFIED REGISTERED

## 2024-04-10 PROCEDURE — 7100000001 HC RECOVERY ROOM TIME - INITIAL BASE CHARGE: Performed by: UROLOGY

## 2024-04-10 PROCEDURE — 3600000003 HC OR TIME - INITIAL BASE CHARGE - PROCEDURE LEVEL THREE: Performed by: UROLOGY

## 2024-04-10 PROCEDURE — RXMED WILLOW AMBULATORY MEDICATION CHARGE

## 2024-04-10 PROCEDURE — 2500000005 HC RX 250 GENERAL PHARMACY W/O HCPCS: Performed by: ANESTHESIOLOGY

## 2024-04-10 PROCEDURE — 3700000002 HC GENERAL ANESTHESIA TIME - EACH INCREMENTAL 1 MINUTE: Performed by: UROLOGY

## 2024-04-10 PROCEDURE — 3700000001 HC GENERAL ANESTHESIA TIME - INITIAL BASE CHARGE: Performed by: UROLOGY

## 2024-04-10 PROCEDURE — 3600000008 HC OR TIME - EACH INCREMENTAL 1 MINUTE - PROCEDURE LEVEL THREE: Performed by: UROLOGY

## 2024-04-10 PROCEDURE — 2500000004 HC RX 250 GENERAL PHARMACY W/ HCPCS (ALT 636 FOR OP/ED): Performed by: ANESTHESIOLOGY

## 2024-04-10 PROCEDURE — 7100000010 HC PHASE TWO TIME - EACH INCREMENTAL 1 MINUTE: Performed by: UROLOGY

## 2024-04-10 PROCEDURE — 2500000005 HC RX 250 GENERAL PHARMACY W/O HCPCS: Performed by: NURSE ANESTHETIST, CERTIFIED REGISTERED

## 2024-04-10 PROCEDURE — 7100000009 HC PHASE TWO TIME - INITIAL BASE CHARGE: Performed by: UROLOGY

## 2024-04-10 RX ORDER — OXYCODONE HYDROCHLORIDE 5 MG/1
5 TABLET ORAL EVERY 4 HOURS PRN
Status: DISCONTINUED | OUTPATIENT
Start: 2024-04-10 | End: 2024-04-10 | Stop reason: HOSPADM

## 2024-04-10 RX ORDER — FENTANYL CITRATE 50 UG/ML
INJECTION, SOLUTION INTRAMUSCULAR; INTRAVENOUS AS NEEDED
Status: DISCONTINUED | OUTPATIENT
Start: 2024-04-10 | End: 2024-04-10

## 2024-04-10 RX ORDER — ONDANSETRON HYDROCHLORIDE 2 MG/ML
4 INJECTION, SOLUTION INTRAVENOUS ONCE AS NEEDED
Status: DISCONTINUED | OUTPATIENT
Start: 2024-04-10 | End: 2024-04-10 | Stop reason: HOSPADM

## 2024-04-10 RX ORDER — OXYCODONE AND ACETAMINOPHEN 5; 325 MG/1; MG/1
1 TABLET ORAL EVERY 6 HOURS PRN
Qty: 10 TABLET | Refills: 0 | Status: SHIPPED | OUTPATIENT
Start: 2024-04-10 | End: 2024-06-03 | Stop reason: WASHOUT

## 2024-04-10 RX ORDER — ONDANSETRON HYDROCHLORIDE 2 MG/ML
INJECTION, SOLUTION INTRAVENOUS AS NEEDED
Status: DISCONTINUED | OUTPATIENT
Start: 2024-04-10 | End: 2024-04-10

## 2024-04-10 RX ORDER — SODIUM CHLORIDE, SODIUM LACTATE, POTASSIUM CHLORIDE, CALCIUM CHLORIDE 600; 310; 30; 20 MG/100ML; MG/100ML; MG/100ML; MG/100ML
100 INJECTION, SOLUTION INTRAVENOUS CONTINUOUS
Status: DISCONTINUED | OUTPATIENT
Start: 2024-04-10 | End: 2024-04-10 | Stop reason: HOSPADM

## 2024-04-10 RX ORDER — NORETHINDRONE AND ETHINYL ESTRADIOL 0.5-0.035
KIT ORAL AS NEEDED
Status: DISCONTINUED | OUTPATIENT
Start: 2024-04-10 | End: 2024-04-10

## 2024-04-10 RX ORDER — CEFAZOLIN SODIUM 2 G/100ML
2 INJECTION, SOLUTION INTRAVENOUS ONCE
Status: DISCONTINUED | OUTPATIENT
Start: 2024-04-10 | End: 2024-04-10 | Stop reason: HOSPADM

## 2024-04-10 RX ORDER — DROPERIDOL 2.5 MG/ML
0.62 INJECTION, SOLUTION INTRAMUSCULAR; INTRAVENOUS ONCE AS NEEDED
Status: DISCONTINUED | OUTPATIENT
Start: 2024-04-10 | End: 2024-04-10 | Stop reason: HOSPADM

## 2024-04-10 RX ORDER — CEFAZOLIN 1 G/1
INJECTION, POWDER, FOR SOLUTION INTRAVENOUS AS NEEDED
Status: DISCONTINUED | OUTPATIENT
Start: 2024-04-10 | End: 2024-04-10

## 2024-04-10 RX ORDER — MIDAZOLAM HYDROCHLORIDE 1 MG/ML
INJECTION INTRAMUSCULAR; INTRAVENOUS AS NEEDED
Status: DISCONTINUED | OUTPATIENT
Start: 2024-04-10 | End: 2024-04-10

## 2024-04-10 RX ORDER — PROPOFOL 10 MG/ML
INJECTION, EMULSION INTRAVENOUS AS NEEDED
Status: DISCONTINUED | OUTPATIENT
Start: 2024-04-10 | End: 2024-04-10

## 2024-04-10 RX ORDER — LIDOCAINE HYDROCHLORIDE 10 MG/ML
INJECTION, SOLUTION EPIDURAL; INFILTRATION; INTRACAUDAL; PERINEURAL AS NEEDED
Status: DISCONTINUED | OUTPATIENT
Start: 2024-04-10 | End: 2024-04-10

## 2024-04-10 RX ADMIN — DEXAMETHASONE SODIUM PHOSPHATE 4 MG: 4 INJECTION INTRA-ARTICULAR; INTRALESIONAL; INTRAMUSCULAR; INTRAVENOUS; SOFT TISSUE at 07:51

## 2024-04-10 RX ADMIN — FENTANYL CITRATE 100 MCG: 50 INJECTION, SOLUTION INTRAMUSCULAR; INTRAVENOUS at 07:43

## 2024-04-10 RX ADMIN — PROPOFOL 150 MG: 10 INJECTION, EMULSION INTRAVENOUS at 07:46

## 2024-04-10 RX ADMIN — ONDANSETRON 4 MG: 2 INJECTION INTRAMUSCULAR; INTRAVENOUS at 07:51

## 2024-04-10 RX ADMIN — SODIUM CHLORIDE, POTASSIUM CHLORIDE, SODIUM LACTATE AND CALCIUM CHLORIDE 100 ML/HR: 600; 310; 30; 20 INJECTION, SOLUTION INTRAVENOUS at 06:45

## 2024-04-10 RX ADMIN — MIDAZOLAM HYDROCHLORIDE 1 MG: 1 INJECTION, SOLUTION INTRAMUSCULAR; INTRAVENOUS at 07:43

## 2024-04-10 RX ADMIN — Medication 3 L/MIN: at 08:18

## 2024-04-10 RX ADMIN — CEFAZOLIN 2 G: 330 INJECTION, POWDER, FOR SOLUTION INTRAMUSCULAR; INTRAVENOUS at 07:36

## 2024-04-10 RX ADMIN — EPHEDRINE SULFATE 25 MG: 50 INJECTION, SOLUTION INTRAVENOUS at 08:00

## 2024-04-10 RX ADMIN — LIDOCAINE HYDROCHLORIDE 50 MG: 10 INJECTION, SOLUTION EPIDURAL; INFILTRATION; INTRACAUDAL; PERINEURAL at 07:46

## 2024-04-10 SDOH — HEALTH STABILITY: MENTAL HEALTH: CURRENT SMOKER: 0

## 2024-04-10 ASSESSMENT — COLUMBIA-SUICIDE SEVERITY RATING SCALE - C-SSRS
6. HAVE YOU EVER DONE ANYTHING, STARTED TO DO ANYTHING, OR PREPARED TO DO ANYTHING TO END YOUR LIFE?: NO
1. IN THE PAST MONTH, HAVE YOU WISHED YOU WERE DEAD OR WISHED YOU COULD GO TO SLEEP AND NOT WAKE UP?: NO
2. HAVE YOU ACTUALLY HAD ANY THOUGHTS OF KILLING YOURSELF?: NO

## 2024-04-10 ASSESSMENT — PAIN - FUNCTIONAL ASSESSMENT: PAIN_FUNCTIONAL_ASSESSMENT: 0-10

## 2024-04-10 ASSESSMENT — PAIN SCALES - GENERAL
PAINLEVEL_OUTOF10: 0 - NO PAIN

## 2024-04-10 NOTE — ANESTHESIA POSTPROCEDURE EVALUATION
Patient: Maxwell Villatoro    Procedure Summary       Date: 04/10/24 Room / Location: [facility] OR 03 / [facility]    Anesthesia Start: 0735 Anesthesia Stop: 0847    Procedure: LITHOTRIPSY EXTRACORPOREAL SHOCK WAVE (Right) Diagnosis:       Kidney stone      (RT KIDNEY STONE N20.0)    Surgeons: Viraj Blum MD Responsible Provider: JOANNE Hewitt    Anesthesia Type: general ASA Status: 3            Anesthesia Type: general    Vitals Value Taken Time   /61 04/10/24 0848   Temp 36.7 °C (98.1 °F) 04/10/24 0818   Pulse 83 04/10/24 0848   Resp 17 04/10/24 0848   SpO2 96 % 04/10/24 0848       Anesthesia Post Evaluation    Patient location during evaluation: PACU  Patient participation: complete - patient participated  Level of consciousness: awake  Pain management: adequate  Multimodal analgesia pain management approach  Airway patency: patent  Two or more strategies used to mitigate risk of obstructive sleep apnea  Cardiovascular status: acceptable  Respiratory status: acceptable  Hydration status: acceptable  Postoperative Nausea and Vomiting: none        No notable events documented.

## 2024-04-10 NOTE — ANESTHESIA PREPROCEDURE EVALUATION
Patient: Maxwell Villatoro    Procedure Information       Date/Time: 04/10/24 0730    Procedure: LITHOTRIPSY EXTRACORPOREAL SHOCK WAVE (Right)    Location: [facility] / [facility]    Surgeons: Viraj Blum MD     There were no vitals filed for this visit.    Past Surgical History:   Procedure Laterality Date    HERNIA REPAIR      multiple hernias    LITHOTRIPSY  08/26/2015    Renal Lithotripsy    LUNG REMOVAL, PARTIAL Right     dr ram     Past Medical History:   Diagnosis Date    Anxiety     Arthritis     Page's esophagus     BPH with obstruction/lower urinary tract symptoms     BPPV (benign paroxysmal positional vertigo)     Class 1 obesity with body mass index (BMI) of 30.0 to 30.9 in adult 01/11/2024    30.77 kg/m²    Coronary atherosclerosis     Depression     SHANNON (dyspnea on exertion)     Eczema     Edentulous     Full dentures     Generalized abdominal pain     GERD (gastroesophageal reflux disease)     Hematuria syndrome 08/28/2023    HLD (hyperlipidemia)     Hypothyroid     Acquired autoimmune hypothyroidism    Inguinal hernia recurrent bilateral     Kidney stone     Lumbar herniated disc     Lung cancer (CMS/HCC)     Squamous cell carcinoma of lung, right    Nephrolithiasis     OA (osteoarthritis) of knee     Bilateral    Obesity     Personal history of other endocrine, nutritional and metabolic disease 08/26/2015    History of hyperlipidemia    PSA elevation     Sleep apnea     no CPAP    Synovial cyst of left knee     Umbilical hernia     Wears glasses        Current Facility-Administered Medications:     ceFAZolin in dextrose (iso-os) (Ancef) IVPB 2 g, 2 g, intravenous, Once, Viraj Blum MD    lactated Ringer's infusion, 100 mL/hr, intravenous, Continuous, Nakul Cabrera MD, Last Rate: 100 mL/hr at 04/10/24 0645, 100 mL/hr at 04/10/24 0645  Prior to Admission medications    Medication Sig Start Date End Date Taking? Authorizing Provider   atorvastatin (Lipitor) 40 mg tablet Take 1  "tablet (40 mg) by mouth once daily at bedtime.   Yes Historical Provider, MD   finasteride (Proscar) 5 mg tablet Take 1 tablet (5 mg) by mouth once daily. 24  Yes Historical Provider, MD   levothyroxine (Synthroid, Levoxyl) 50 mcg tablet Take 1 tablet (50 mcg) by mouth once daily at bedtime. 3/4/24  Yes Eugenioananya Mcwilliams, DO   pantoprazole (ProtoNix) 40 mg EC tablet Take 1 tablet (40 mg) by mouth once daily at bedtime. DO NOT CRUSH CHEW OR SPLIT 24  Yes Eugenio Mcwilliams, DO   tamsulosin (Flomax) 0.4 mg 24 hr capsule Take 1 capsule (0.4 mg) by mouth once daily at bedtime.   Yes Historical Provider, MD   meclizine (Antivert) 25 mg tablet Meclizine HCl - 25 MG Oral Tablet TAKE 1 TABLET 3 TIMES DAILY AS NEEDED. Quantity: 45 Refills: 0 Moritz MD, John Start : 10-Jul-2020 Active 7/10/20 4/5/24  Historical Provider, MD     No Known Allergies  Social History     Tobacco Use    Smoking status: Former     Current packs/day: 0.00     Types: Cigarettes     Quit date:      Years since quittin.2    Smokeless tobacco: Never   Substance Use Topics    Alcohol use: Yes     Comment: occasionally         Chemistry    Lab Results   Component Value Date/Time     2024 1510    K 4.0 2024 1510     2024 1510    CO2 25 2024 1510    BUN 15 2024 1510    CREATININE 1.04 2024 1510    Lab Results   Component Value Date/Time    CALCIUM 9.0 2024 1510    ALKPHOS 73 2024 1510    AST 24 2024 1510    ALT 23 2024 1510    BILITOT 0.6 2024 1510          Lab Results   Component Value Date/Time    WBC 6.5 2024 1510    HGB 15.5 2024 1510    HCT 46.9 2024 1510     2024 1510     No results found for: \"PROTIME\", \"PTT\", \"INR\"  No results found for this or any previous visit (from the past 4464 hour(s)).  No results found for this or any previous visit from the past 1095 days.        Relevant Problems   Cardiac   (+) Coronary " atherosclerosis      Pulmonary   (+) Dyspnea on exertion   (+) Lung nodules   (+) Obstructive sleep apnea   (+) Shortness of breath on exertion   (+) Squamous cell carcinoma of lung, right (CMS/HCC)      Neuro   (+) Lumbar radiculitis      GI   (+) Gastroesophageal reflux disease with esophagitis      /Renal   (+) Benign prostatic hyperplasia with nocturia      Endocrine   (+) Acquired autoimmune hypothyroidism   (+) Obesity      Musculoskeletal   (+) Herniated lumbar intervertebral disc   (+) Lumbar herniated disc      Skin   (+) Eczema       Clinical information reviewed:    Allergies  Meds               NPO Detail:  NPO/Void Status  Date of Last Liquid: 04/09/24  Date of Last Solid: 04/09/24         Physical Exam    Airway  Mallampati: I  TM distance: >3 FB  Neck ROM: full     Cardiovascular - normal exam     Dental    Pulmonary - normal exam     Abdominal - normal exam           Anesthesia Plan    History of general anesthesia?: yes  History of complications of general anesthesia?: no    ASA 3     general     The patient is not a current smoker.    intravenous induction   Postoperative administration of opioids is intended.  Anesthetic plan and risks discussed with patient.  Use of blood products discussed with patient who.    Plan discussed with CRNA and attending.

## 2024-04-10 NOTE — ANESTHESIA PROCEDURE NOTES
Airway  Date/Time: 4/10/2024 7:46 AM    Staffing  Performed: CRNA   Authorized by: JOANNE Hewitt    Performed by: JOANNE Hewitt    Indications and Patient Condition  Indications for airway management: anesthesia  Spontaneous Ventilation: absent  Sedation level: deep  Preoxygenated: yes  Patient position: sniffing  Mask difficulty assessment: 1 - vent by mask    Final Airway Details  Final airway type: supraglottic airway

## 2024-04-10 NOTE — OP NOTE
LITHOTRIPSY EXTRACORPOREAL SHOCK WAVE (R) Operative Note     Date: 4/10/2024  OR Location: [facility] OR    Name: Maxwell Villatoro, : 1945, Age: 78 y.o., MRN: [id number], Sex: male    Diagnosis  Pre-op Diagnosis     * Kidney stone [N20.0] Post-op Diagnosis     * Kidney stone [N20.0]     Procedures  LITHOTRIPSY EXTRACORPOREAL SHOCK WAVE  00752 - NC LITHOTRIPSY XTRCORP SHOCK WAVE      Surgeons      * Viraj Blum - Primary    Resident/Fellow/Other Assistant:  Surgeons and Role:  * No surgeons found with a matching role *    Procedure Summary  Anesthesia: Consult  ASA: III  Anesthesia Staff: CRNA: PRETTY Hewitt-CRNA  Estimated Blood Loss: 0mL  Intra-op Medications:   Administrations occurring from 0730 to 0830 on 04/10/24:   Medication Name Total Dose   lactated Ringer's infusion Cannot be calculated              Anesthesia Record               Intraprocedure I/O Totals          Intake    lactated Ringer's infusion 800.00 mL    Total Intake 800 mL          Specimen: No specimens collected     Staff:   Circulator: Roberta Arce RN; Natasha Villatoro RN  Scrub Person: Vasquez Dewey           Drains and/or Catheters: None    Findings: Right renal stones fragmented    Indications: Maxwell Villatoro is an 78 y.o. male who is having surgery for RT KIDNEY STONE N20.0.  He is scheduled for right ESWL.  Risks including infection, bleeding, injury to the kidney, obstruction for fragments were reviewed.  Written consent was obtained.    Procedure Details: Patient was taken to the operating room and given general anesthesia.  He was positioned supine and fluoroscopy was used to target 2 stones within the kidney.  A total of 3000 shocks were delivered and there appeared to be fragmentation.  The patient tolerated the procedure and was transferred to the recovery room in stable condition.    Complications:  None; patient tolerated the procedure well.          Viraj Blum  Phone Number: [phone]

## 2024-05-10 ENCOUNTER — LAB (OUTPATIENT)
Dept: LAB | Facility: LAB | Age: 79
End: 2024-05-10
Payer: MEDICARE

## 2024-05-10 ENCOUNTER — HOSPITAL ENCOUNTER (OUTPATIENT)
Dept: RADIOLOGY | Facility: CLINIC | Age: 79
Discharge: HOME | End: 2024-05-10
Payer: MEDICARE

## 2024-05-10 DIAGNOSIS — N20.0 KIDNEY CALCULUS: ICD-10-CM

## 2024-05-10 DIAGNOSIS — R97.20 ELEVATED PROSTATE SPECIFIC ANTIGEN (PSA): Primary | ICD-10-CM

## 2024-05-10 LAB — PSA SERPL-MCNC: 2.49 NG/ML

## 2024-05-10 PROCEDURE — 36415 COLL VENOUS BLD VENIPUNCTURE: CPT

## 2024-05-10 PROCEDURE — 74018 RADEX ABDOMEN 1 VIEW: CPT

## 2024-05-10 PROCEDURE — 74018 RADEX ABDOMEN 1 VIEW: CPT | Performed by: RADIOLOGY

## 2024-05-10 PROCEDURE — 84153 ASSAY OF PSA TOTAL: CPT

## 2024-05-17 ENCOUNTER — TELEPHONE (OUTPATIENT)
Dept: PHARMACY | Facility: HOSPITAL | Age: 79
End: 2024-05-17
Payer: MEDICARE

## 2024-05-17 NOTE — TELEPHONE ENCOUNTER
Population Health: Outreach by Ambulatory Pharmacy Team    Payor: [employer] [place]  Reason: Adherence  Medication: Atorvastatin 40 mg  Outcome: Left Voicemail    Jovani Echeverria, PharmD

## 2024-06-03 ENCOUNTER — OFFICE VISIT (OUTPATIENT)
Dept: PRIMARY CARE | Facility: CLINIC | Age: 79
End: 2024-06-03
Payer: MEDICARE

## 2024-06-03 VITALS
HEIGHT: 68 IN | HEART RATE: 59 BPM | SYSTOLIC BLOOD PRESSURE: 119 MMHG | BODY MASS INDEX: 29.02 KG/M2 | OXYGEN SATURATION: 97 % | DIASTOLIC BLOOD PRESSURE: 69 MMHG | WEIGHT: 191.5 LBS

## 2024-06-03 DIAGNOSIS — E06.3 ACQUIRED AUTOIMMUNE HYPOTHYROIDISM: ICD-10-CM

## 2024-06-03 DIAGNOSIS — E78.00 ELEVATED LDL CHOLESTEROL LEVEL: Primary | ICD-10-CM

## 2024-06-03 DIAGNOSIS — K22.719 BARRETT'S ESOPHAGUS WITH DYSPLASIA: ICD-10-CM

## 2024-06-03 PROCEDURE — 1036F TOBACCO NON-USER: CPT | Performed by: FAMILY MEDICINE

## 2024-06-03 PROCEDURE — 99214 OFFICE O/P EST MOD 30 MIN: CPT | Performed by: FAMILY MEDICINE

## 2024-06-03 PROCEDURE — 1159F MED LIST DOCD IN RCRD: CPT | Performed by: FAMILY MEDICINE

## 2024-06-03 RX ORDER — LEVOTHYROXINE SODIUM 50 UG/1
50 TABLET ORAL NIGHTLY
Qty: 90 TABLET | Refills: 1 | Status: SHIPPED | OUTPATIENT
Start: 2024-06-03

## 2024-06-03 RX ORDER — PANTOPRAZOLE SODIUM 40 MG/1
40 TABLET, DELAYED RELEASE ORAL NIGHTLY
Qty: 90 TABLET | Refills: 1 | Status: SHIPPED | OUTPATIENT
Start: 2024-06-03

## 2024-06-03 ASSESSMENT — PATIENT HEALTH QUESTIONNAIRE - PHQ9
SUM OF ALL RESPONSES TO PHQ9 QUESTIONS 1 AND 2: 0
1. LITTLE INTEREST OR PLEASURE IN DOING THINGS: NOT AT ALL
2. FEELING DOWN, DEPRESSED OR HOPELESS: NOT AT ALL

## 2024-06-03 ASSESSMENT — ENCOUNTER SYMPTOMS
LOSS OF SENSATION IN FEET: 0
DEPRESSION: 0
OCCASIONAL FEELINGS OF UNSTEADINESS: 0

## 2024-06-03 NOTE — PATIENT INSTRUCTIONS
Proton Pump Inhibitors (PPI, e.g., Prilosec/omeprazole, Nexium/esomeprazole, Protonix/pantoprazole, and others) may cause vitamin or mineral deficiencies, kidney damage, dementia, stomach polyps, fractures and thinning of the bones (osteoporosis), intestinal infections including C. diff., lupus. Some of these side effects are more likely with chronic use. There are other possible side effects, and it is recommended, as with any medication, to review all possible side effects. Chronic PPI use may be necessary in certain situations (e.g., Page esophagus).    Please return for a(n) follow-up appointment in 6 months, after tests to review results and options, earlier if any question or concern.    Recommended vaccines:  Influenza, annual  Avoid taking Biotin (a vitamin, shows up particularly in hair/nail supplements) for a week prior to any blood tests, as it can interfere with certain results. Fasting for labs means 12 hours, nothing to eat or drink, except water and medications, unless directed otherwise.    For assistance with scheduling referrals or consultations, please call [phone]. For laboratory, radiology, and other tests, please call [phone] ([phone] for pediatrics). Please review prescription inserts and published information for possible adverse effects of all medications. Return after testing or consultation to review results and recommendations, if symptoms persist, change, worsen, or return, or if you have any question or concern. If you do not get results within 7-10 days, or you have any question or concern, please send a message, call the office ([phone]), or return to the office for a follow-up appointment. For non-emergencies, you may call the office. For emergencies, call 9-1-1 or go to the nearest Emergency Department. Please schedule additional appointment(s) to address concern(s) not addressed today.    In general, results are not released or discussed over the telephone,  but at an appointment or via  expresscoin. Results of tests done through [facility] are released via  expresscoin (see below).  [email or web address]   expresscoin support line: [phone]

## 2024-06-03 NOTE — PROGRESS NOTES
"Subjective   Patient ID: Maxwell Villatoro is a 78 y.o. male who presents for Follow-up (Pt presents for 6 month check up, no concerns, no rx's. BL).  HPI Historian(s): Self and Wife    Generally feeling well.      Review of Systems   Cardiovascular:  Negative for chest pain.   All other systems reviewed and are negative.        Objective   /69   Pulse 59   Ht 1.727 m (5' 8\")   Wt 86.9 kg (191 lb 8 oz)   SpO2 97%   BMI 29.12 kg/m²     Lab Results   Component Value Date    HGBA1C 5.0 10/31/2023    HGBA1C 5.4 07/08/2021     Lab Results   Component Value Date    LDLCALC 63 10/31/2023    TRIG 91 10/31/2023    TRIG 124 09/19/2020    TRIG 71 09/16/2019    HDL 34.3 10/31/2023    HDL 31.4 (A) 09/19/2020    HDL 36.9 (A) 09/16/2019     Lab Results   Component Value Date    TSH 3.64 10/31/2023    TSH 5.10 (H) 06/22/2022    TSH 3.75 07/08/2021    TSH 6.88 (H) 12/08/2020    TSH 5.62 (H) 09/19/2020        Physical Exam  Vitals and nursing note reviewed.   Constitutional:       General: He is not in acute distress.     Appearance: He is not diaphoretic.      Comments: No assistive device presently being used.   HENT:      Head: Normocephalic and atraumatic.   Eyes:      General: No scleral icterus.     Conjunctiva/sclera: Conjunctivae normal.   Cardiovascular:      Rate and Rhythm: Normal rate and regular rhythm.      Heart sounds: Normal heart sounds.   Pulmonary:      Effort: Pulmonary effort is normal. No respiratory distress.      Breath sounds: Normal breath sounds. No wheezing, rhonchi or rales.   Abdominal:      General: Bowel sounds are normal. There is no distension.      Palpations: Abdomen is soft. There is no mass.      Tenderness: There is no abdominal tenderness. There is no guarding or rebound.   Musculoskeletal:      Right lower leg: No edema.      Left lower leg: No edema.   Skin:     General: Skin is warm and dry.   Neurological:      General: No focal deficit present.      Mental Status: He is alert. " Mental status is at baseline.   Psychiatric:         Mood and Affect: Mood normal.         Thought Content: Thought content normal.         Assessment/Plan   Problem List Items Addressed This Visit       Acquired autoimmune hypothyroidism    Relevant Medications    levothyroxine (Synthroid, Levoxyl) 50 mcg tablet    Page's esophagus    Relevant Medications    pantoprazole (ProtoNix) 40 mg EC tablet    Elevated LDL cholesterol level - Primary    Relevant Orders    Comprehensive Metabolic Panel    Creatine Kinase    Lipid Panel    Follow Up In Primary Care    TSH with reflex to Free T4 if abnormal

## 2024-06-27 ENCOUNTER — APPOINTMENT (OUTPATIENT)
Dept: HEMATOLOGY/ONCOLOGY | Facility: CLINIC | Age: 79
End: 2024-06-27
Payer: MEDICARE

## 2024-07-16 ENCOUNTER — TELEPHONE (OUTPATIENT)
Dept: PRIMARY CARE | Facility: CLINIC | Age: 79
End: 2024-07-16
Payer: MEDICARE

## 2024-10-30 DIAGNOSIS — R35.1 BENIGN PROSTATIC HYPERPLASIA WITH NOCTURIA: ICD-10-CM

## 2024-10-30 DIAGNOSIS — N40.1 BENIGN PROSTATIC HYPERPLASIA WITH NOCTURIA: ICD-10-CM

## 2024-10-31 RX ORDER — TAMSULOSIN HYDROCHLORIDE 0.4 MG/1
0.4 CAPSULE ORAL NIGHTLY
Qty: 90 CAPSULE | Refills: 3 | Status: SHIPPED | OUTPATIENT
Start: 2024-10-31

## 2024-12-04 PROBLEM — N20.0 CALCULUS OF KIDNEY: Status: ACTIVE | Noted: 2024-12-04

## 2024-12-04 PROBLEM — C34.90 MALIGNANT NEOPLASM OF UNSPECIFIED PART OF UNSPECIFIED BRONCHUS OR LUNG (MULTI): Status: ACTIVE | Noted: 2024-12-04

## 2024-12-05 ENCOUNTER — APPOINTMENT (OUTPATIENT)
Dept: PRIMARY CARE | Facility: CLINIC | Age: 79
End: 2024-12-05
Payer: MEDICARE

## 2024-12-07 NOTE — PROGRESS NOTES
Patient is a 79 y.o. male presenting with dysuria    SUBJECTIVE:  HPI   He has had dysuria intermittently for 1 month. He is voiding well.   He reports an injury to his ventral distal penis from a fence.    Urine Culture (no units)   Date Value   2024 Normal genitourinary josué      Past Medical History:   Diagnosis Date    Anxiety     Arthritis     Page's esophagus     BPH with obstruction/lower urinary tract symptoms     BPPV (benign paroxysmal positional vertigo)     Class 1 obesity with body mass index (BMI) of 30.0 to 30.9 in adult 2024    30.77 kg/m²    Coronary atherosclerosis     Depression     SHANNON (dyspnea on exertion)     Eczema     Edentulous     Full dentures     Generalized abdominal pain     GERD (gastroesophageal reflux disease)     Hematuria syndrome 2023    HLD (hyperlipidemia)     Hypothyroid     Acquired autoimmune hypothyroidism    Inguinal hernia recurrent bilateral     Kidney stone     Lumbar herniated disc     Lung cancer (Multi)     Squamous cell carcinoma of lung, right    Nephrolithiasis     OA (osteoarthritis) of knee     Bilateral    Obesity     Personal history of other endocrine, nutritional and metabolic disease 2015    History of hyperlipidemia    PSA elevation     Sleep apnea     no CPAP    Synovial cyst of left knee     Umbilical hernia     Wears glasses       Past Surgical History:   Procedure Laterality Date    HERNIA REPAIR      multiple hernias    LITHOTRIPSY  2015    Renal Lithotripsy    LUNG REMOVAL, PARTIAL Right     dr ram      Family History   Family history unknown: Yes      Social History     Socioeconomic History    Marital status:    Tobacco Use    Smoking status: Former     Current packs/day: 0.00     Types: Cigarettes     Quit date:      Years since quittin.9    Smokeless tobacco: Never   Vaping Use    Vaping status: Every Day    Substances: Flavoring    Devices: Disposable   Substance and Sexual Activity     "Alcohol use: Yes     Comment: occasionally    Drug use: Never    Sexual activity: Defer      OBJECTIVE:  There were no vitals taken for this visit.  Physical Exam   Constitutional: No obvious distress.  Eyes: Non-injected conjunctiva, sclera clear, EOMI.  Ears/Nose/Mouth/Throat: No obvious drainage per ears or nose.  Cardiovascular: Extremities are warm and well perfused. No edema, cyanosis or pallor.  Respiratory: No audible wheezing/stridor; respirations do not appear labored.  Gastrointestinal: Abdomen soft, not distended.  Musculoskeletal: Normal ROM of extremities.  Skin: No obvious rashes or open sores.  Neurologic: Alert and oriented, CN 2-12 grossly intact.  Psychiatric: Answers questions appropriately with normal affect.  Hematologic/Lymphatic/Immunologic: No obvious bruises or sites of spontaneous bleeding.  Genitourinary: No CVA tenderness, bladder not palpable. Prostate 3+ no nodules or tenderness. He has a 2x5mm penile lesion at the location where he pinched his penis on a fence.     Labs:  Lab Results   Component Value Date    WBC 6.5 04/05/2024    HGB 15.5 04/05/2024    HCT 46.9 04/05/2024     04/05/2024    CHOL 115 10/31/2023    TRIG 91 10/31/2023    HDL 34.3 10/31/2023    ALT 23 04/05/2024    AST 24 04/05/2024     04/05/2024    K 4.0 04/05/2024     04/05/2024    CREATININE 1.04 04/05/2024    BUN 15 04/05/2024    CO2 25 04/05/2024    TSH 3.64 10/31/2023    PSA 2.49 05/10/2024    HGBA1C 5.0 10/31/2023     No results found for: \"KPSAT\", \"KPSAP\"  IMAGING:      PROCEDURES:    ASSESSMENT/PLAN:  Problem List Items Addressed This Visit       UTI symptoms    Relevant Medications    cephalexin (Keflex) 500 mg capsule    Abnormal finding on urinalysis    Relevant Orders    Urine Culture    Microscopic Only, Urine      He has history of elevated PSA. He had a negative prostate biopsies in 2006, 2012, 2014, and 2020, and negative MRI in 2019. He had an elevated ExoDx. His PSA in May 2024 " decreased to 2.49 from 6.59 in February 2024.  He will repeat a PSA after treatment of UTI.    He has UTI on UA today. Urine will be sent for microscopy, culture, and sensitivity. He will start Keflex 500mg tid for 1 week.    He has history of kidney stones. His last KUB identified bilateral small stones in May 2024.    He has history of BPH. He is on tamsulosin.    All questions were answered to the patient’s satisfaction.  Patient agrees with the plan and wishes to proceed.  Follow-up will be scheduled appropriately.     Scribe Attestation  By signing my name below, I, Sonya Diane,   attest that this documentation has been prepared under the direction and in the presence of Viraj Blum MD.

## 2024-12-10 ENCOUNTER — APPOINTMENT (OUTPATIENT)
Dept: UROLOGY | Facility: CLINIC | Age: 79
End: 2024-12-10
Payer: MEDICARE

## 2024-12-10 DIAGNOSIS — R39.9 URINARY SYMPTOM OR SIGN: ICD-10-CM

## 2024-12-10 DIAGNOSIS — R39.9 UTI SYMPTOMS: ICD-10-CM

## 2024-12-10 DIAGNOSIS — R97.20 ELEVATED PSA: Primary | ICD-10-CM

## 2024-12-10 DIAGNOSIS — R82.90 ABNORMAL FINDING ON URINALYSIS: ICD-10-CM

## 2024-12-10 LAB
POC APPEARANCE, URINE: CLEAR
POC BILIRUBIN, URINE: NEGATIVE
POC BLOOD, URINE: ABNORMAL
POC COLOR, URINE: YELLOW
POC GLUCOSE, URINE: NEGATIVE MG/DL
POC KETONES, URINE: NEGATIVE MG/DL
POC LEUKOCYTES, URINE: ABNORMAL
POC NITRITE,URINE: POSITIVE
POC PH, URINE: 7 PH
POC PROTEIN, URINE: NEGATIVE MG/DL
POC SPECIFIC GRAVITY, URINE: 1.01
POC UROBILINOGEN, URINE: 0.2 EU/DL

## 2024-12-10 PROCEDURE — 99214 OFFICE O/P EST MOD 30 MIN: CPT | Performed by: UROLOGY

## 2024-12-10 PROCEDURE — 81001 URINALYSIS AUTO W/SCOPE: CPT

## 2024-12-10 PROCEDURE — G2211 COMPLEX E/M VISIT ADD ON: HCPCS | Performed by: UROLOGY

## 2024-12-10 PROCEDURE — 87086 URINE CULTURE/COLONY COUNT: CPT

## 2024-12-10 PROCEDURE — 81003 URINALYSIS AUTO W/O SCOPE: CPT | Performed by: UROLOGY

## 2024-12-10 RX ORDER — CEPHALEXIN 500 MG/1
500 CAPSULE ORAL 3 TIMES DAILY
Qty: 21 CAPSULE | Refills: 0 | Status: SHIPPED | OUTPATIENT
Start: 2024-12-10 | End: 2024-12-17

## 2024-12-11 LAB
BACTERIA UR CULT: NORMAL
MUCOUS THREADS #/AREA URNS AUTO: ABNORMAL /LPF
RBC #/AREA URNS AUTO: ABNORMAL /HPF
WBC #/AREA URNS AUTO: ABNORMAL /HPF
WBC CLUMPS #/AREA URNS AUTO: ABNORMAL /HPF

## 2025-01-18 NOTE — PROGRESS NOTES
Patient is a 79 y.o. male presenting for follow up erectile dysfunction, BPH, elevated PSA, and PMH of kidney stones    SUBJECTIVE:  HPI   He has had dysuria intermittently for 1 month. He is voiding well.     He is status post right ESWL April 2024. He has not had any recent stones pass. He denies hematuria or dysuria.    He has erectile dysfunction. He reports an injury to his ventral distal penis from a fence. He does not take nitroglycerine.       Past Medical History:   Diagnosis Date    Anxiety     Arthritis     Page's esophagus     BPH with obstruction/lower urinary tract symptoms     BPPV (benign paroxysmal positional vertigo)     Class 1 obesity with body mass index (BMI) of 30.0 to 30.9 in adult 01/11/2024    30.77 kg/m²    Coronary atherosclerosis     Depression     SHANNON (dyspnea on exertion)     Eczema     Edentulous     Full dentures     Generalized abdominal pain     GERD (gastroesophageal reflux disease)     Hematuria syndrome 08/28/2023    HLD (hyperlipidemia)     Hypothyroid     Acquired autoimmune hypothyroidism    Inguinal hernia recurrent bilateral     Kidney stone     Lumbar herniated disc     Lung cancer (Multi)     Squamous cell carcinoma of lung, right    Nephrolithiasis     OA (osteoarthritis) of knee     Bilateral    Obesity     Personal history of other endocrine, nutritional and metabolic disease 08/26/2015    History of hyperlipidemia    PSA elevation     Sleep apnea     no CPAP    Synovial cyst of left knee     Umbilical hernia     Wears glasses      Past Surgical History:   Procedure Laterality Date    HERNIA REPAIR      multiple hernias    LITHOTRIPSY  08/26/2015    Renal Lithotripsy    LUNG REMOVAL, PARTIAL Right     dr ram      Family History   Family history unknown: Yes      Tobacco Use: Medium Risk (6/3/2024)    Patient History     Smoking Tobacco Use: Former     Smokeless Tobacco Use: Never     Passive Exposure: Not on file       Review of Systems   Constitutional: denies  any unintentional weight loss or change in strength.  Integumentary: denies any rashes or pruritus.  Eyes: denies any double vision or eye pain.  Ear/Nose/Mouth/Throat: denies any nosebleeds or gum bleeds.  Cardiovascular: denies any chest pain or syncope.  Respiratory: denies hemoptysis.  Gastrointestinal: denies nausea or vomiting.  Musculoskeletal: denies muscle cramping or weakness.  Neurologic: denies convulsions or seizures.  Hematologic/Lymphatic: denies bleeding tendencies.  Endocrine: denies heat/cold intolerance.  All other systems have been reviewed and are negative unless otherwise noted in the HPI.    OBJECTIVE:  There were no vitals taken for this visit.  Physical Exam   Constitutional: No obvious distress.  Eyes: Non-injected conjunctiva, sclera clear, EOMI.  Ears/Nose/Mouth/Throat: No obvious drainage per ears or nose.  Cardiovascular: Extremities are warm and well perfused. No edema, cyanosis or pallor.  Respiratory: No audible wheezing/stridor; respirations do not appear labored.  Gastrointestinal: Abdomen soft, not distended.  Musculoskeletal: Normal ROM of extremities.  Skin: No obvious rashes or open sores.  Neurologic: Alert and oriented, CN 2-12 grossly intact.  Psychiatric: Answers questions appropriately with normal affect.  Hematologic/Lymphatic/Immunologic: No obvious bruises or sites of spontaneous bleeding.  Genitourinary: No CVA tenderness, bladder not palpable.     Current Outpatient Medications   Medication Instructions    atorvastatin (LIPITOR) 40 mg, oral, Nightly    finasteride (PROSCAR) 5 mg, oral, Daily    levothyroxine (SYNTHROID, LEVOXYL) 50 mcg, oral, Nightly    pantoprazole (PROTONIX) 40 mg, oral, Nightly, DO NOT CRUSH CHEW OR SPLIT    tamsulosin (FLOMAX) 0.4 mg, oral, Nightly     No Known Allergies  Labs:  Lab Results   Component Value Date    GLUCOSE 83 04/05/2024    CALCIUM 9.0 04/05/2024     04/05/2024    K 4.0 04/05/2024    CO2 25 04/05/2024     04/05/2024     "BUN 15 04/05/2024    CREATININE 1.04 04/05/2024     No results found for: \"URICACID\"  No results found for: \"PTH\"  No results found for: \"TESTOSTERONE\"    Prostate Specific AG (ng/mL)   Date Value   05/10/2024 2.49   02/13/2024 6.59 (H)     PSA (ng/mL)   Date Value   07/20/2023 5.89 (H)   01/19/2023 5.39 (H)   06/22/2022 5.84 (H)   07/08/2021 8.2 (H)   10/10/2017 5.6 (H)      Urine Culture (no units)   Date Value   12/10/2024     Clinically insignificant growth based on current clinical standards.      IMAGING:    === 05/10/24 ===    XR ABDOMEN 1 VIEW  FINDINGS:  Exam limited by large amounts of colonic debris overlying the right  kidney    ~At least three of four stones confirmed in the right kidney on prior  CT are still present and project on radiograph    ~Both of the left renal stones confirmed on the prior CT are still  present and project on radiograph    ~No stone or stone fragment projects over either ureter    ~Pelvic calcifications correspond to prostatic and vascular etiology.  No definite urinary bladder stone    ~No dilated gas-filled bowel     PROCEDURES:   mL    ASSESSMENT/PLAN:  Problem List Items Addressed This Visit       Elevated PSA    Relevant Orders    PSA    Urinary symptom or sign    Relevant Orders    POCT UA Automated manually resulted (Completed)    Measure post void residual (Completed)    Abnormal finding on urinalysis    Relevant Orders    Urine Culture    Microscopic Only, Urine    Erectile dysfunction - Primary      He has history of elevated PSA. He had a negative prostate biopsies in 2006, 2012, 2014, and 2020, and negative MRI in 2019. He had an elevated ExoDx. His PSA in May 2024 decreased to 2.49 from 6.59 in February 2024. He will repeat PSA in a few weeks.     He has history of kidney stones. His last KUB identified bilateral small stones in May 2024.    He has history of BPH. He will continue tamsulosin.    He has erectile dysfunction. We discussed treatment with " sildenafil 100 mg PRN. Adverse effects and dosage reviewed. He was advised to start with 1/2 tablet. He will separate it from his tamsulosin by at least 4 hours.     UA had trace leukocytes today (01/21/25) and will be sent for microscopy, culture, and sensitivity.     He will follow up in 4 months.    All questions were answered to the patient’s satisfaction.  Patient agrees with the plan and wishes to proceed.  Follow-up will be scheduled appropriately.     Scribe Attestation  By signing my name below, I, Sonya Diane,   attest that this documentation has been prepared under the direction and in the presence of Viraj Blum MD.

## 2025-01-21 ENCOUNTER — APPOINTMENT (OUTPATIENT)
Dept: UROLOGY | Facility: CLINIC | Age: 80
End: 2025-01-21
Payer: MEDICARE

## 2025-01-21 DIAGNOSIS — R39.9 URINARY SYMPTOM OR SIGN: ICD-10-CM

## 2025-01-21 DIAGNOSIS — R82.90 ABNORMAL FINDING ON URINALYSIS: ICD-10-CM

## 2025-01-21 DIAGNOSIS — N30.00 ACUTE CYSTITIS WITHOUT HEMATURIA: ICD-10-CM

## 2025-01-21 DIAGNOSIS — R97.20 ELEVATED PSA: ICD-10-CM

## 2025-01-21 DIAGNOSIS — N52.9 ERECTILE DYSFUNCTION, UNSPECIFIED ERECTILE DYSFUNCTION TYPE: Primary | ICD-10-CM

## 2025-01-21 LAB
POC APPEARANCE, URINE: CLEAR
POC BILIRUBIN, URINE: NEGATIVE
POC BLOOD, URINE: NEGATIVE
POC COLOR, URINE: YELLOW
POC GLUCOSE, URINE: NEGATIVE MG/DL
POC KETONES, URINE: NEGATIVE MG/DL
POC LEUKOCYTES, URINE: ABNORMAL
POC NITRITE,URINE: NEGATIVE
POC PH, URINE: 7 PH
POC PROTEIN, URINE: NEGATIVE MG/DL
POC SPECIFIC GRAVITY, URINE: 1.02
POC UROBILINOGEN, URINE: 0.2 EU/DL
RBC #/AREA URNS AUTO: NORMAL /HPF
WBC #/AREA URNS AUTO: NORMAL /HPF

## 2025-01-21 PROCEDURE — 99214 OFFICE O/P EST MOD 30 MIN: CPT | Performed by: UROLOGY

## 2025-01-21 PROCEDURE — G2211 COMPLEX E/M VISIT ADD ON: HCPCS | Performed by: UROLOGY

## 2025-01-21 PROCEDURE — 87086 URINE CULTURE/COLONY COUNT: CPT

## 2025-01-21 PROCEDURE — 81003 URINALYSIS AUTO W/O SCOPE: CPT | Performed by: UROLOGY

## 2025-01-21 PROCEDURE — 81001 URINALYSIS AUTO W/SCOPE: CPT

## 2025-01-21 RX ORDER — SILDENAFIL 100 MG/1
100 TABLET, FILM COATED ORAL AS NEEDED
Qty: 5 TABLET | Refills: 5 | Status: SHIPPED | OUTPATIENT
Start: 2025-01-21 | End: 2025-02-20

## 2025-01-23 LAB — BACTERIA UR CULT: NORMAL

## 2025-02-07 ENCOUNTER — APPOINTMENT (OUTPATIENT)
Dept: PRIMARY CARE | Facility: CLINIC | Age: 80
End: 2025-02-07
Payer: MEDICARE

## 2025-02-07 VITALS
BODY MASS INDEX: 30.07 KG/M2 | WEIGHT: 198.4 LBS | OXYGEN SATURATION: 98 % | HEIGHT: 68 IN | DIASTOLIC BLOOD PRESSURE: 68 MMHG | SYSTOLIC BLOOD PRESSURE: 120 MMHG | HEART RATE: 68 BPM

## 2025-02-07 DIAGNOSIS — N40.1 BENIGN PROSTATIC HYPERPLASIA WITH NOCTURIA: ICD-10-CM

## 2025-02-07 DIAGNOSIS — C34.91 SQUAMOUS CELL CARCINOMA OF LUNG, RIGHT (MULTI): ICD-10-CM

## 2025-02-07 DIAGNOSIS — R35.1 BENIGN PROSTATIC HYPERPLASIA WITH NOCTURIA: ICD-10-CM

## 2025-02-07 DIAGNOSIS — R13.10 DYSPHAGIA, UNSPECIFIED TYPE: ICD-10-CM

## 2025-02-07 DIAGNOSIS — Z00.00 ROUTINE ADULT HEALTH MAINTENANCE: ICD-10-CM

## 2025-02-07 DIAGNOSIS — E55.9 VITAMIN D DEFICIENCY: ICD-10-CM

## 2025-02-07 DIAGNOSIS — Z00.00 ROUTINE GENERAL MEDICAL EXAMINATION AT HEALTH CARE FACILITY: Primary | ICD-10-CM

## 2025-02-07 DIAGNOSIS — R91.8 LUNG NODULES: ICD-10-CM

## 2025-02-07 DIAGNOSIS — E78.00 ELEVATED LDL CHOLESTEROL LEVEL: ICD-10-CM

## 2025-02-07 DIAGNOSIS — S01.312A LACERATION OF LEFT EAR CANAL, INITIAL ENCOUNTER: ICD-10-CM

## 2025-02-07 DIAGNOSIS — R73.09 BLOOD GLUCOSE ABNORMAL: ICD-10-CM

## 2025-02-07 DIAGNOSIS — R97.20 ELEVATED PSA: ICD-10-CM

## 2025-02-07 DIAGNOSIS — Z23 NEED FOR VACCINATION: ICD-10-CM

## 2025-02-07 DIAGNOSIS — E06.3 ACQUIRED AUTOIMMUNE HYPOTHYROIDISM: ICD-10-CM

## 2025-02-07 DIAGNOSIS — I25.10 ATHEROSCLEROSIS OF NATIVE CORONARY ARTERY OF NATIVE HEART WITHOUT ANGINA PECTORIS: ICD-10-CM

## 2025-02-07 DIAGNOSIS — H61.22 IMPACTED CERUMEN OF LEFT EAR: ICD-10-CM

## 2025-02-07 PROCEDURE — 1170F FXNL STATUS ASSESSED: CPT | Performed by: NURSE PRACTITIONER

## 2025-02-07 PROCEDURE — 99214 OFFICE O/P EST MOD 30 MIN: CPT | Performed by: NURSE PRACTITIONER

## 2025-02-07 PROCEDURE — 69209 REMOVE IMPACTED EAR WAX UNI: CPT | Performed by: NURSE PRACTITIONER

## 2025-02-07 PROCEDURE — 1159F MED LIST DOCD IN RCRD: CPT | Performed by: NURSE PRACTITIONER

## 2025-02-07 PROCEDURE — 90662 IIV NO PRSV INCREASED AG IM: CPT | Performed by: NURSE PRACTITIONER

## 2025-02-07 PROCEDURE — G0008 ADMIN INFLUENZA VIRUS VAC: HCPCS | Performed by: NURSE PRACTITIONER

## 2025-02-07 PROCEDURE — 1158F ADVNC CARE PLAN TLK DOCD: CPT | Performed by: NURSE PRACTITIONER

## 2025-02-07 PROCEDURE — 1160F RVW MEDS BY RX/DR IN RCRD: CPT | Performed by: NURSE PRACTITIONER

## 2025-02-07 PROCEDURE — 1123F ACP DISCUSS/DSCN MKR DOCD: CPT | Performed by: NURSE PRACTITIONER

## 2025-02-07 PROCEDURE — G0439 PPPS, SUBSEQ VISIT: HCPCS | Performed by: NURSE PRACTITIONER

## 2025-02-07 RX ORDER — CIPROFLOXACIN AND DEXAMETHASONE 3; 1 MG/ML; MG/ML
4 SUSPENSION/ DROPS AURICULAR (OTIC) 2 TIMES DAILY
Qty: 7.5 ML | Refills: 0 | Status: SHIPPED | OUTPATIENT
Start: 2025-02-07 | End: 2025-02-14

## 2025-02-07 ASSESSMENT — ACTIVITIES OF DAILY LIVING (ADL)
DOING_HOUSEWORK: INDEPENDENT
TAKING_MEDICATION: INDEPENDENT
MANAGING_FINANCES: INDEPENDENT
GROCERY_SHOPPING: INDEPENDENT
DRESSING: INDEPENDENT
BATHING: INDEPENDENT

## 2025-02-07 ASSESSMENT — ENCOUNTER SYMPTOMS
DIARRHEA: 0
BACK PAIN: 0
DIZZINESS: 0
COLOR CHANGE: 0
WEAKNESS: 0
MYALGIAS: 0
ABDOMINAL PAIN: 0
WOUND: 0
EYE PAIN: 0
NAUSEA: 0
JOINT SWELLING: 0
ADENOPATHY: 0
SEIZURES: 0
CONSTIPATION: 0
COUGH: 0
ARTHRALGIAS: 1
FEVER: 0
DEPRESSION: 0
LOSS OF SENSATION IN FEET: 0
CHILLS: 0
ABDOMINAL DISTENTION: 0
BRUISES/BLEEDS EASILY: 0
SHORTNESS OF BREATH: 1
TROUBLE SWALLOWING: 1
WHEEZING: 0
HEADACHES: 0
FATIGUE: 0
DIFFICULTY URINATING: 0
OCCASIONAL FEELINGS OF UNSTEADINESS: 0
PALPITATIONS: 0

## 2025-02-07 ASSESSMENT — COLUMBIA-SUICIDE SEVERITY RATING SCALE - C-SSRS
6. HAVE YOU EVER DONE ANYTHING, STARTED TO DO ANYTHING, OR PREPARED TO DO ANYTHING TO END YOUR LIFE?: NO
2. HAVE YOU ACTUALLY HAD ANY THOUGHTS OF KILLING YOURSELF?: NO
1. IN THE PAST MONTH, HAVE YOU WISHED YOU WERE DEAD OR WISHED YOU COULD GO TO SLEEP AND NOT WAKE UP?: NO

## 2025-02-07 NOTE — PATIENT INSTRUCTIONS
Orders are in place for you to have fasting blood work completed prior to your next appointment. Please do not eat or drink 8 hours prior to having your blood drawn.   You may have your blood work completed in this building through [facility] ([address] [facility] [address], [address], [place] [id number]).  For this location, you may schedule an appointment online or drop-in for walk-in services. [email or web address]  Hours:   Monday - Friday: 7:30 a.m. - 5 p.m. and Saturday: 8 a.m. - 11 a.m.  Phone:  [phone]     Please arrange for routine follow up in 6 months. You may schedule on-line through [employer] or call our office at [phone].

## 2025-02-07 NOTE — PROGRESS NOTES
Subjective   Patient ID: Maxwell Villatoro is a 79 y.o. male who presents for Follow-up and Medicare Annual Wellness Visit Subsequent.    Patient seen today in order to establish primary care as well as complete an annual Medicare wellness exam.  Present for today's visit is his spouse.  Patient and spouse are both retired and live together at home.  He reports staying very active in the day-to-day and does report some issues with shortness of breath upon exertion.  Patient is a former smoker and also underwent a wedge resection due to lung cancer.  He has not seen oncology since 2023 and no additional imaging of the lungs completed since last January.  Patient denies any issues with chest pain/pressure.  No reported issues with appetite, staying hydrated, bowel or bladder but new concerns of dysphagia reported.  Patient states he feels like he needs to have his throat dilated as his wife has undergone this same procedure in the past.  He has never himself been evaluated for any sort of strictures/stenosis.  Patient and wife state that he will follow-up with her gastroenterologist through private practice.  He continues to follow with urology due to history of kidney stones and elevated PSA levels.  He wears glasses and has no teeth as he is currently not wearing dentures.  Patient denies any issues with mood or insomnia.  Occasional vertigo reported but appears to be self-limiting and intermittent in nature.  Patient also reports intermittent issues with right arm and left shoulder pain.  He states that surgeries have been recommended in the past but he is not interested in pursuing these at this time.  Medications reviewed.  No other acute concerns voiced at this time.       Current Outpatient Medications on File Prior to Visit   Medication Sig Dispense Refill    atorvastatin (Lipitor) 40 mg tablet Take 1 tablet (40 mg) by mouth once daily at bedtime.      finasteride (Proscar) 5 mg tablet Take 1 tablet (5 mg) by  mouth once daily.      levothyroxine (Synthroid, Levoxyl) 50 mcg tablet Take 1 tablet (50 mcg) by mouth once daily at bedtime. 90 tablet 1    pantoprazole (ProtoNix) 40 mg EC tablet Take 1 tablet (40 mg) by mouth once daily at bedtime. DO NOT CRUSH CHEW OR SPLIT 90 tablet 1    sildenafil (Viagra) 100 mg tablet Take 1 tablet (100 mg) by mouth if needed for erectile dysfunction. 5 tablet 5    tamsulosin (Flomax) 0.4 mg 24 hr capsule Take 1 capsule (0.4 mg) by mouth once daily at bedtime. 90 capsule 3     No current facility-administered medications on file prior to visit.       Past Medical History:   Diagnosis Date    Anxiety     Arthritis     Page's esophagus     BPH with obstruction/lower urinary tract symptoms     BPPV (benign paroxysmal positional vertigo)     Class 1 obesity with body mass index (BMI) of 30.0 to 30.9 in adult 01/11/2024    30.77 kg/m²    Coronary atherosclerosis     Depression     SHANNON (dyspnea on exertion)     Eczema     Edentulous     Full dentures     Generalized abdominal pain     GERD (gastroesophageal reflux disease)     Hematuria syndrome 08/28/2023    HLD (hyperlipidemia)     Hypothyroid     Acquired autoimmune hypothyroidism    Inguinal hernia recurrent bilateral     Kidney stone     Lumbar herniated disc     Lung cancer (Multi)     Squamous cell carcinoma of lung, right    Nephrolithiasis     OA (osteoarthritis) of knee     Bilateral    Obesity     Personal history of other endocrine, nutritional and metabolic disease 08/26/2015    History of hyperlipidemia    PSA elevation     Sleep apnea     no CPAP    Synovial cyst of left knee     Umbilical hernia     Wears glasses         Past Surgical History:   Procedure Laterality Date    HERNIA REPAIR      multiple hernias    LITHOTRIPSY  08/26/2015    Renal Lithotripsy    LUNG REMOVAL, PARTIAL Right     dr ram        Family History   Family history unknown: Yes        Review of Systems   Constitutional:  Negative for chills, fatigue and  "fever.   HENT:  Positive for trouble swallowing.         See HPI.  Has upper and lower dentures.   Eyes:  Negative for pain and visual disturbance.        Wears glasses. Positive for cataracts   Respiratory:  Positive for shortness of breath. Negative for cough and wheezing.         Positive for shortness of breath upon exertion   Cardiovascular:  Negative for chest pain, palpitations and leg swelling.   Gastrointestinal:  Negative for abdominal distention, abdominal pain, constipation, diarrhea and nausea.   Endocrine: Negative for cold intolerance and heat intolerance.   Genitourinary:  Negative for difficulty urinating.   Musculoskeletal:  Positive for arthralgias. Negative for back pain, gait problem, joint swelling and myalgias.        Positive for intermittent left shoulder and right arm pain   Skin:  Negative for color change, pallor, rash and wound.   Allergic/Immunologic: Negative for environmental allergies and food allergies.   Neurological:  Negative for dizziness, seizures, weakness and headaches.        Positive for occasional vertigo   Hematological:  Negative for adenopathy. Does not bruise/bleed easily.   Psychiatric/Behavioral:  Negative for behavioral problems.    All other systems reviewed and are negative.      Objective   /68   Pulse 68   Ht 1.727 m (5' 8\")   Wt 90 kg (198 lb 6.4 oz)   SpO2 98%   BMI 30.17 kg/m²     Physical Exam  Constitutional:       General: He is not in acute distress.     Appearance: Normal appearance. He is not toxic-appearing.   HENT:      Head: Normocephalic and atraumatic.      Right Ear: Tympanic membrane, ear canal and external ear normal.      Left Ear: Ear canal and external ear normal. There is impacted cerumen.      Nose: Nose normal.      Mouth/Throat:      Mouth: Mucous membranes are moist.      Dentition: Has dentures.      Pharynx: Oropharynx is clear.   Eyes:      Extraocular Movements: Extraocular movements intact.      Conjunctiva/sclera: " Conjunctivae normal.      Pupils: Pupils are equal, round, and reactive to light.   Cardiovascular:      Rate and Rhythm: Normal rate and regular rhythm.      Pulses: Normal pulses.      Heart sounds: Normal heart sounds. No murmur heard.  Pulmonary:      Effort: Pulmonary effort is normal.      Breath sounds: Normal breath sounds. No wheezing.   Abdominal:      General: Bowel sounds are normal.      Palpations: Abdomen is soft.   Musculoskeletal:         General: No swelling.      Cervical back: Normal range of motion and neck supple.   Skin:     General: Skin is warm and dry.   Neurological:      General: No focal deficit present.      Mental Status: He is alert and oriented to person, place, and time. Mental status is at baseline.      Cranial Nerves: No cranial nerve deficit.      Motor: No weakness.   Psychiatric:         Mood and Affect: Mood normal.         Behavior: Behavior normal.         Thought Content: Thought content normal.         Judgment: Judgment normal.         Assessment/Plan   Problem List Items Addressed This Visit             ICD-10-CM    Acquired autoimmune hypothyroidism E06.3     Lab Results   Component Value Date    TSH 3.64 10/31/2023     Patient to continue current levothyroxine dosing.  Will continue to monitor thyroid levels routinely.         Benign prostatic hyperplasia with nocturia N40.1, R35.1     Patient continues on tamsulosin per urology recommendations.  Provider to be notified for any persistent/worsening urinary concerns         Coronary atherosclerosis I25.10     Patient to maintain routine follow-up with his cardiologist, Dr. Multani, for continued valuation and treatment recommendations regarding CAD.  Provider to be notified for any new cardiac concerns.         Elevated LDL cholesterol level E78.00     Patient continues on daily statin without issue.  Will continue to monitor fasting cholesterol panel routinely.          Lung nodules R91.8     Most recent CT scan  "completed in September, 2024 and showed, \"Lower Chest: Partially visualized scarring and calcified granulomas in the right middle lobe. Streaky atelectasis in the lung bases and lingula. Right lower lobe pulmonary nodule measuring 6 mm. Left lung base pulmonary nodule measuring 7 mm. Left lower lobe 3 mm pulmonary Nodule\".  Continue with annual follow-up?  Awaiting to hear back from oncology for additional recommendations regarding additional imaging for surveillance purposes         Elevated PSA R97.20     Lab Results   Component Value Date    PSA 2.49 05/10/2024    PSA 6.59 (H) 02/13/2024    PSA 5.89 (H) 07/20/2023     Patient maintains routine follow-up with urology for continued evaluation regarding elevated PSA levels.         Squamous cell carcinoma of lung, right (Multi) C34.91     Most recent follow-up completed with oncology in 2023.  Per specialty notes, \"former smoker had screening CT done showing a right upper lob lung nodule 1.4 cm FDG avid by PET. completed wedge resection and mediastinal lymph node sampling showing a small squamous cell cancer with baseloid features, negative margins.... 5 years out from wedge resection doing well. Advised follow up in 1 year with CT chest non contrast and then can return to routine care with primary care.\"  Message sent to patient's oncology team regarding most recent CT scans and persistent pulmonary nodules.  Per patient no additional follow-up needed?         Relevant Orders    Comprehensive Metabolic Panel    CBC and Auto Differential    Laceration of left ear canal S01.312A     Minor laceration obtained during irrigation patient's left ear.  Antibiotic drops ordered preemptively.  Provider to be notified for any persistent issues with pain, discomfort or infection concerns.         Relevant Medications    ciprofloxacin-dexamethasone (CiproDEX) otic suspension    Routine adult health maintenance Z00.00     Routine blood work ordered today for monitoring purposes. "  Will continue to monitor as appropriate.  -Most recent colonoscopy completed in 2024.  No additional recommendations for repeat screening given age and lack of symptoms.         Relevant Orders    Flu vaccine, trivalent, preservative free, HIGH-DOSE, age 65y+ (Fluzone) (Completed)    Hemoglobin A1C    Comprehensive Metabolic Panel    TSH with reflex to Free T4 if abnormal    Lipid Panel    Vitamin D 25-Hydroxy,Total (for eval of Vitamin D levels)    CBC and Auto Differential    Dysphagia R13.10     Patient instructed to follow-up with gastroenterology in the near future for evaluation purposes due to concerns of possible stricture?  Provider to be notified for any persistent/worsening swallowing concerns          Other Visit Diagnoses         Codes    Routine general medical examination at health care facility    -  Primary Z00.00    Relevant Orders    1 Year Follow Up In Primary Care - Wellness Exam    Need for vaccination     Z23    Relevant Orders    Flu vaccine, trivalent, preservative free, HIGH-DOSE, age 65y+ (Fluzone) (Completed)    Blood glucose abnormal     R73.09    Relevant Orders    Hemoglobin A1C    Vitamin D deficiency     E55.9    Relevant Orders    Vitamin D 25-Hydroxy,Total (for eval of Vitamin D levels)

## 2025-02-07 NOTE — PROGRESS NOTES
Patient ID: Maxwell Villatoro is a 79 y.o. male.    Ear Cerumen Removal    Date/Time: 2/7/2025 4:05 PM    Performed by: Bety Dias MA  Authorized by: ULISES Ybarra    Consent:     Consent obtained:  Verbal    Consent given by:  Patient    Risks, benefits, and alternatives were discussed: yes      Risks discussed:  Bleeding, infection, pain, incomplete removal and dizziness    Alternatives discussed:  Alternative treatment  Universal protocol:     Procedure explained and questions answered to patient or proxy's satisfaction: yes      Patient identity confirmed:  Verbally with patient  Procedure details:     Location:  L ear    Procedure type: irrigation      Procedure outcomes: cerumen removed    Post-procedure details:     Inspection:  Bleeding and ear canal clear    Hearing quality:  Normal    Procedure completion:  Tolerated

## 2025-02-10 PROBLEM — E66.9 OBESITY: Status: RESOLVED | Noted: 2024-02-12 | Resolved: 2025-02-10

## 2025-02-10 PROBLEM — R10.13 ABDOMINAL PAIN, ACUTE, EPIGASTRIC: Status: RESOLVED | Noted: 2023-03-07 | Resolved: 2025-02-10

## 2025-02-10 PROBLEM — L30.4 INTERTRIGO: Status: RESOLVED | Noted: 2023-03-07 | Resolved: 2025-02-10

## 2025-02-10 PROBLEM — S01.312A LACERATION OF LEFT EAR CANAL: Status: ACTIVE | Noted: 2025-02-10

## 2025-02-10 PROBLEM — J20.9 ACUTE BRONCHITIS: Status: RESOLVED | Noted: 2023-03-07 | Resolved: 2025-02-10

## 2025-02-10 PROBLEM — R53.83 FATIGUE: Status: RESOLVED | Noted: 2023-03-07 | Resolved: 2025-02-10

## 2025-02-10 PROBLEM — Z86.39 HISTORY OF ELEVATED LIPIDS: Status: RESOLVED | Noted: 2024-02-12 | Resolved: 2025-02-10

## 2025-02-10 PROBLEM — R10.9 ABDOMINAL PAIN: Status: RESOLVED | Noted: 2023-03-07 | Resolved: 2025-02-10

## 2025-02-10 PROBLEM — R73.09 ABNORMAL GLUCOSE: Status: RESOLVED | Noted: 2023-03-07 | Resolved: 2025-02-10

## 2025-02-10 PROBLEM — R79.89 LOW TSH LEVEL: Status: RESOLVED | Noted: 2023-03-07 | Resolved: 2025-02-10

## 2025-02-10 PROBLEM — R39.9 URINARY SYMPTOM OR SIGN: Status: RESOLVED | Noted: 2024-12-10 | Resolved: 2025-02-10

## 2025-02-10 PROBLEM — R06.09 DYSPNEA ON EXERTION: Status: RESOLVED | Noted: 2023-03-07 | Resolved: 2025-02-10

## 2025-02-10 PROBLEM — M71.22 SYNOVIAL CYST OF LEFT KNEE: Status: RESOLVED | Noted: 2023-08-18 | Resolved: 2025-02-10

## 2025-02-10 PROBLEM — Z00.00 ROUTINE ADULT HEALTH MAINTENANCE: Status: ACTIVE | Noted: 2025-02-10

## 2025-02-10 PROBLEM — R13.10 DYSPHAGIA: Status: ACTIVE | Noted: 2025-02-10

## 2025-02-10 PROBLEM — R82.90 ABNORMAL FINDING ON URINALYSIS: Status: RESOLVED | Noted: 2024-12-10 | Resolved: 2025-02-10

## 2025-02-10 PROBLEM — R06.02 SHORTNESS OF BREATH ON EXERTION: Status: RESOLVED | Noted: 2023-03-07 | Resolved: 2025-02-10

## 2025-02-10 PROBLEM — L30.9 ECZEMA: Status: RESOLVED | Noted: 2023-03-07 | Resolved: 2025-02-10

## 2025-02-10 PROBLEM — N30.00 ACUTE CYSTITIS WITHOUT HEMATURIA: Status: RESOLVED | Noted: 2025-01-21 | Resolved: 2025-02-10

## 2025-02-11 NOTE — ASSESSMENT & PLAN NOTE
Patient to maintain routine follow-up with his cardiologist, Dr. Multani, for continued valuation and treatment recommendations regarding CAD.  Provider to be notified for any new cardiac concerns.

## 2025-02-11 NOTE — ASSESSMENT & PLAN NOTE
Patient continues on daily statin without issue.  Will continue to monitor fasting cholesterol panel routinely.

## 2025-02-11 NOTE — ASSESSMENT & PLAN NOTE
Lab Results   Component Value Date    PSA 2.49 05/10/2024    PSA 6.59 (H) 02/13/2024    PSA 5.89 (H) 07/20/2023     Patient maintains routine follow-up with urology for continued evaluation regarding elevated PSA levels.

## 2025-02-11 NOTE — ASSESSMENT & PLAN NOTE
Routine blood work ordered today for monitoring purposes.  Will continue to monitor as appropriate.  -Most recent colonoscopy completed in 2024.  No additional recommendations for repeat screening given age and lack of symptoms.

## 2025-02-11 NOTE — ASSESSMENT & PLAN NOTE
Lab Results   Component Value Date    TSH 3.64 10/31/2023     Patient to continue current levothyroxine dosing.  Will continue to monitor thyroid levels routinely.

## 2025-02-11 NOTE — ASSESSMENT & PLAN NOTE
Minor laceration obtained during irrigation patient's left ear.  Antibiotic drops ordered preemptively.  Provider to be notified for any persistent issues with pain, discomfort or infection concerns.

## 2025-02-11 NOTE — ASSESSMENT & PLAN NOTE
Patient continues on tamsulosin per urology recommendations.  Provider to be notified for any persistent/worsening urinary concerns

## 2025-02-11 NOTE — ASSESSMENT & PLAN NOTE
"Most recent follow-up completed with oncology in 2023.  Per specialty notes, \"former smoker had screening CT done showing a right upper lob lung nodule 1.4 cm FDG avid by PET. completed wedge resection and mediastinal lymph node sampling showing a small squamous cell cancer with baseloid features, negative margins.... 5 years out from wedge resection doing well. Advised follow up in 1 year with CT chest non contrast and then can return to routine care with primary care.\"  Message sent to patient's oncology team regarding most recent CT scans and persistent pulmonary nodules.  Per patient no additional follow-up needed?  "

## 2025-02-11 NOTE — ASSESSMENT & PLAN NOTE
"Most recent CT scan completed in September, 2024 and showed, \"Lower Chest: Partially visualized scarring and calcified granulomas in the right middle lobe. Streaky atelectasis in the lung bases and lingula. Right lower lobe pulmonary nodule measuring 6 mm. Left lung base pulmonary nodule measuring 7 mm. Left lower lobe 3 mm pulmonary Nodule\".  Continue with annual follow-up?  Awaiting to hear back from oncology for additional recommendations regarding additional imaging for surveillance purposes  "

## 2025-02-11 NOTE — ASSESSMENT & PLAN NOTE
Patient instructed to follow-up with gastroenterology in the near future for evaluation purposes due to concerns of possible stricture?  Provider to be notified for any persistent/worsening swallowing concerns

## 2025-02-13 DIAGNOSIS — K22.719 BARRETT'S ESOPHAGUS WITH DYSPLASIA: ICD-10-CM

## 2025-02-13 RX ORDER — PANTOPRAZOLE SODIUM 40 MG/1
TABLET, DELAYED RELEASE ORAL
Qty: 90 TABLET | Refills: 1 | Status: SHIPPED | OUTPATIENT
Start: 2025-02-13

## 2025-02-28 ENCOUNTER — TELEPHONE (OUTPATIENT)
Dept: PRIMARY CARE | Facility: CLINIC | Age: 80
End: 2025-02-28
Payer: MEDICARE

## 2025-02-28 LAB
25(OH)D3+25(OH)D2 SERPL-MCNC: 21 NG/ML (ref 30–100)
ALBUMIN SERPL-MCNC: 4.3 G/DL (ref 3.6–5.1)
ALP SERPL-CCNC: 76 U/L (ref 35–144)
ALT SERPL-CCNC: 15 U/L (ref 9–46)
ANION GAP SERPL CALCULATED.4IONS-SCNC: 7 MMOL/L (CALC) (ref 7–17)
AST SERPL-CCNC: 17 U/L (ref 10–35)
BASOPHILS # BLD AUTO: 50 CELLS/UL (ref 0–200)
BASOPHILS NFR BLD AUTO: 0.9 %
BILIRUB SERPL-MCNC: 0.7 MG/DL (ref 0.2–1.2)
BUN SERPL-MCNC: 16 MG/DL (ref 7–25)
CALCIUM SERPL-MCNC: 9.3 MG/DL (ref 8.6–10.3)
CHLORIDE SERPL-SCNC: 106 MMOL/L (ref 98–110)
CHOLEST SERPL-MCNC: 184 MG/DL
CHOLEST/HDLC SERPL: 4.5 (CALC)
CO2 SERPL-SCNC: 28 MMOL/L (ref 20–32)
CREAT SERPL-MCNC: 1 MG/DL (ref 0.7–1.28)
EGFRCR SERPLBLD CKD-EPI 2021: 77 ML/MIN/1.73M2
EOSINOPHIL # BLD AUTO: 168 CELLS/UL (ref 15–500)
EOSINOPHIL NFR BLD AUTO: 3 %
ERYTHROCYTE [DISTWIDTH] IN BLOOD BY AUTOMATED COUNT: 12.4 % (ref 11–15)
EST. AVERAGE GLUCOSE BLD GHB EST-MCNC: 108 MG/DL
EST. AVERAGE GLUCOSE BLD GHB EST-SCNC: 6 MMOL/L
GLUCOSE SERPL-MCNC: 96 MG/DL (ref 65–99)
HBA1C MFR BLD: 5.4 % OF TOTAL HGB
HCT VFR BLD AUTO: 48.6 % (ref 38.5–50)
HDLC SERPL-MCNC: 41 MG/DL
HGB BLD-MCNC: 15.9 G/DL (ref 13.2–17.1)
LDLC SERPL CALC-MCNC: 122 MG/DL (CALC)
LYMPHOCYTES # BLD AUTO: 1434 CELLS/UL (ref 850–3900)
LYMPHOCYTES NFR BLD AUTO: 25.6 %
MCH RBC QN AUTO: 29.1 PG (ref 27–33)
MCHC RBC AUTO-ENTMCNC: 32.7 G/DL (ref 32–36)
MCV RBC AUTO: 88.8 FL (ref 80–100)
MONOCYTES # BLD AUTO: 347 CELLS/UL (ref 200–950)
MONOCYTES NFR BLD AUTO: 6.2 %
NEUTROPHILS # BLD AUTO: 3601 CELLS/UL (ref 1500–7800)
NEUTROPHILS NFR BLD AUTO: 64.3 %
NONHDLC SERPL-MCNC: 143 MG/DL (CALC)
PLATELET # BLD AUTO: 187 THOUSAND/UL (ref 140–400)
PMV BLD REES-ECKER: 10.3 FL (ref 7.5–12.5)
POTASSIUM SERPL-SCNC: 4.4 MMOL/L (ref 3.5–5.3)
PROT SERPL-MCNC: 6.8 G/DL (ref 6.1–8.1)
PSA SERPL-MCNC: 6.09 NG/ML
RBC # BLD AUTO: 5.47 MILLION/UL (ref 4.2–5.8)
SODIUM SERPL-SCNC: 141 MMOL/L (ref 135–146)
T4 FREE SERPL-MCNC: 1.3 NG/DL (ref 0.8–1.8)
TRIGL SERPL-MCNC: 100 MG/DL
TSH SERPL-ACNC: 4.53 MIU/L (ref 0.4–4.5)
WBC # BLD AUTO: 5.6 THOUSAND/UL (ref 3.8–10.8)

## 2025-02-28 NOTE — TELEPHONE ENCOUNTER
----- Message from Germainekari Guevara sent at 2/28/2025 10:54 AM EST -----  Can you please update patient regarding most recent blood work results?  Thyroid levels, kidney function, liver function, hemoglobin A1c and blood counts are all relatively stable.  Mild elevations in cholesterol panel noted.  Please encourage patient to follow a low-fat, low-cholesterol diet and increase activity levels as able.  Vitamin D levels were found to be insufficient.  Because of this a daily vitamin D supplement is being sent to your pharmacy.  Will continue to monitor blood work routinely.  Please notify the office for any additional questions or concerns.

## 2025-05-18 NOTE — PROGRESS NOTES
Patient is a 79 y.o. male presenting for follow up erectile dysfunction, BPH, elevated PSA, and PMH of kidney stones    SUBJECTIVE:  HPI   He presents for 4-month followup.  He continues tamsulosin.   He has not passed any recent stones.   He states his stream can be weak at times.     He is status post right ESWL April 2024.   He has erectile dysfunction. He has history of an injury to the ventral distal penis from a fence. He does not take nitroglycerine.     Past Medical History:   Diagnosis Date    Anxiety     Arthritis     Page's esophagus     BPH with obstruction/lower urinary tract symptoms     BPPV (benign paroxysmal positional vertigo)     Class 1 obesity with body mass index (BMI) of 30.0 to 30.9 in adult 01/11/2024    30.77 kg/m²    Coronary atherosclerosis     Depression     SHANNON (dyspnea on exertion)     Eczema     Edentulous     Full dentures     Generalized abdominal pain     GERD (gastroesophageal reflux disease)     Hematuria syndrome 08/28/2023    HLD (hyperlipidemia)     Hypothyroid     Acquired autoimmune hypothyroidism    Inguinal hernia recurrent bilateral     Kidney stone     Lumbar herniated disc     Lung cancer (Multi)     Squamous cell carcinoma of lung, right    Nephrolithiasis     OA (osteoarthritis) of knee     Bilateral    Obesity     Personal history of other endocrine, nutritional and metabolic disease 08/26/2015    History of hyperlipidemia    PSA elevation     Sleep apnea     no CPAP    Synovial cyst of left knee     Umbilical hernia     Wears glasses      Past Surgical History:   Procedure Laterality Date    HERNIA REPAIR      multiple hernias    LITHOTRIPSY  08/26/2015    Renal Lithotripsy    LUNG REMOVAL, PARTIAL Right     dr ram      Family History   Family history unknown: Yes      Tobacco Use: Medium Risk (2/7/2025)    Patient History     Smoking Tobacco Use: Former     Smokeless Tobacco Use: Never     Passive Exposure: Not on file       Review of Systems   All systems  have been reviewed and are negative unless otherwise noted in the HPI.    OBJECTIVE:  There were no vitals taken for this visit.  Physical Exam   Constitutional: No obvious distress.  Eyes: Non-injected conjunctiva, sclera clear, EOMI.  Ears/Nose/Mouth/Throat: No obvious drainage per ears or nose.  Cardiovascular: Extremities are warm and well perfused. No edema, cyanosis or pallor.  Respiratory: No audible wheezing/stridor; respirations do not appear labored.  Gastrointestinal: Abdomen soft, not distended.  Musculoskeletal: Normal ROM of extremities.  Skin: No obvious rashes or open sores.  Neurologic: Alert and oriented, CN 2-12 grossly intact.  Psychiatric: Answers questions appropriately with normal affect.  Hematologic/Lymphatic/Immunologic: No obvious bruises or sites of spontaneous bleeding.  Genitourinary: No CVA tenderness, bladder not palpable. No scrotal masses or tenderness. No penile lesions. ZAIRE: prostate is without nodules or tenderness.     Labs:  Lab Results   Component Value Date    WBC 5.6 02/27/2025    HGB 15.9 02/27/2025    HCT 48.6 02/27/2025    MCV 88.8 02/27/2025     02/27/2025    GLUCOSE 96 02/27/2025    CALCIUM 9.3 02/27/2025     02/27/2025    K 4.4 02/27/2025    CO2 28 02/27/2025     02/27/2025    BUN 16 02/27/2025    CREATININE 1.00 02/27/2025    EGFR 77 02/27/2025    PSA 6.09 (H) 02/27/2025    URINECULTURE  01/21/2025     Growth indicates contamination with periurethral josué. Repeat culture if clinically indicated.       IMAGING:  === 01/15/24 ===  CT ABDOMEN PELVIS W IV CONTRAST  - Impression -  No acute abnormality in the abdomen or pelvis.  Nonobstructive bilateral renal calculi.  Prostatomegaly. Posterior bladder diverticula. Correlate clinically  for bladder outlet obstruction.    Multiple pulmonary nodules measuring up to 7 mm. Consider follow up  non contrast chest CT at 3-6 months, then consider CT chest at 18-24  months (Chirs Tucker et al., Guidelines for  management of  incidental pulmonary nodules detected on CT images: From the  Fleischner Society 2017, Radiology. 2017 Jul;284 (1):228-243.)    PROCEDURES:  Bladder scan: PVR:  388 mL     ASSESSMENT/PLAN:  Problem List Items Addressed This Visit       Elevated PSA     Other Visit Diagnoses         Microscopic hematuria    -  Primary      Urinary symptom or sign        Relevant Orders    POCT UA Automated manually resulted (Completed)    Measure post void residual (Completed)            He has history of elevated PSA, at most to 8.2 in 7/21. He had a negative prostate biopsies in 2006, 2012, 2014, and 2020, and negative MRI in 2019. He had an elevated ExoDx. His PSA is stable. We discussed prostate MRI/biopsy vs conservative management. He has elected for conservative management. He will repeat PSA in 5 weeks.  PSA summary  02/27/25 6.09  05/10/24 2.49  02/13/24 6.59  07/20/23 5.89    He has history of kidney stones. CT was viewed and identified bilateral small stones in January 2025, up to 6 mm on the right side and 5 mm on the left. He has elected for conservative management.    He has history of BPH, treated with tamsulosin.       He has urinary retention of 388 mL on bladder scan today (5/20/25). He is asymptomatic. He will followup in 6 weeks for repeat bladder scan and possible cystoscopy.    He has erectile dysfunction. He was prescribed sildenafil in the past, however did not use the medication.     He has microscopic hematuria. Urinalysis had large blood and trace leukocytes and will be sent for microscopy, culture, and sensitivity.     All questions were answered to the patient’s satisfaction.  Patient agrees with the plan and wishes to proceed.  Follow-up will be scheduled appropriately.     Scribe Attestation  By signing my name below, I, Sonya Diane,   attest that this documentation has been prepared under the direction and in the presence of Viraj Blum MD.

## 2025-05-20 ENCOUNTER — APPOINTMENT (OUTPATIENT)
Dept: UROLOGY | Facility: CLINIC | Age: 80
End: 2025-05-20
Payer: MEDICARE

## 2025-05-20 DIAGNOSIS — R33.9 URINARY RETENTION: ICD-10-CM

## 2025-05-20 DIAGNOSIS — R97.20 ELEVATED PSA: ICD-10-CM

## 2025-05-20 DIAGNOSIS — R31.29 MICROSCOPIC HEMATURIA: Primary | ICD-10-CM

## 2025-05-20 DIAGNOSIS — R39.9 URINARY SYMPTOM OR SIGN: ICD-10-CM

## 2025-05-20 LAB
POC BILIRUBIN, URINE: NEGATIVE
POC BLOOD, URINE: ABNORMAL
POC GLUCOSE, URINE: ABNORMAL MG/DL
POC KETONES, URINE: NEGATIVE MG/DL
POC LEUKOCYTES, URINE: ABNORMAL
POC NITRITE,URINE: NEGATIVE
POC PH, URINE: 7 PH
POC PROTEIN, URINE: NEGATIVE MG/DL
POC SPECIFIC GRAVITY, URINE: 1.01
POC UROBILINOGEN, URINE: 0.2 EU/DL

## 2025-05-20 PROCEDURE — 1159F MED LIST DOCD IN RCRD: CPT | Performed by: UROLOGY

## 2025-05-20 PROCEDURE — 81003 URINALYSIS AUTO W/O SCOPE: CPT | Performed by: UROLOGY

## 2025-05-20 PROCEDURE — 1036F TOBACCO NON-USER: CPT | Performed by: UROLOGY

## 2025-05-20 PROCEDURE — 1160F RVW MEDS BY RX/DR IN RCRD: CPT | Performed by: UROLOGY

## 2025-05-20 PROCEDURE — 99214 OFFICE O/P EST MOD 30 MIN: CPT | Performed by: UROLOGY

## 2025-05-20 PROCEDURE — G2211 COMPLEX E/M VISIT ADD ON: HCPCS | Performed by: UROLOGY

## 2025-05-22 LAB
BACTERIA #/AREA URNS HPF: ABNORMAL /HPF
BACTERIA UR CULT: ABNORMAL
HYALINE CASTS #/AREA URNS LPF: ABNORMAL /LPF
RBC #/AREA URNS HPF: ABNORMAL /HPF
SERVICE CMNT-IMP: ABNORMAL
SQUAMOUS #/AREA URNS HPF: ABNORMAL /HPF
WBC #/AREA URNS HPF: ABNORMAL /HPF

## 2025-05-26 DIAGNOSIS — N30.00 ACUTE CYSTITIS WITHOUT HEMATURIA: Primary | ICD-10-CM

## 2025-05-26 RX ORDER — CEPHALEXIN 500 MG/1
500 CAPSULE ORAL 3 TIMES DAILY
Qty: 21 CAPSULE | Refills: 0 | Status: SHIPPED | OUTPATIENT
Start: 2025-05-26 | End: 2025-06-02

## 2025-05-30 DIAGNOSIS — E06.3 ACQUIRED AUTOIMMUNE HYPOTHYROIDISM: ICD-10-CM

## 2025-05-30 RX ORDER — LEVOTHYROXINE SODIUM 50 UG/1
50 TABLET ORAL NIGHTLY
Qty: 90 TABLET | Refills: 0 | Status: SHIPPED | OUTPATIENT
Start: 2025-05-30

## 2025-06-27 LAB — PSA SERPL-MCNC: 5.67 NG/ML

## 2025-07-01 ENCOUNTER — APPOINTMENT (OUTPATIENT)
Dept: UROLOGY | Facility: CLINIC | Age: 80
End: 2025-07-01
Payer: MEDICARE

## 2025-07-27 NOTE — PROGRESS NOTES
Patient is a 80 y.o. male presenting for followup with PM of BPH, kidney stones, urinary retention, erectile dysfunction and microscopic hematuria.    SUBJECTIVE:  HPI   He presents for repeat bladder scan and possible cystoscopy to evaluate microscopic hematuria, and urinary retention (PVR of 388 mL in May 2025, asymptomatic). His rentention has improved and he has elected to hold on cystoscopy today.    He is status post right ESWL April 2024.   He has erectile dysfunction and a history of an injury to the ventral distal penis from a fence. He does not take nitroglycerin.     Review of Systems   Pertinent findings noted in the HPI.     OBJECTIVE:  There were no vitals taken for this visit.  Physical Exam   Constitutional: No obvious distress.  Cardiovascular: Extremities are warm and well perfused.  Respiratory: No audible wheezing/stridor; respirations do not appear labored.  Neurologic: Alert and oriented x3.  Genitourinary: No CVA tenderness, bladder not palpable.      Labs:  Lab Results   Component Value Date    WBC 5.6 02/27/2025    HGB 15.9 02/27/2025    HCT 48.6 02/27/2025    MCV 88.8 02/27/2025     02/27/2025    GLUCOSE 96 02/27/2025    CALCIUM 9.3 02/27/2025     02/27/2025    K 4.4 02/27/2025    CO2 28 02/27/2025     02/27/2025    BUN 16 02/27/2025    CREATININE 1.00 02/27/2025    EGFR 77 02/27/2025    PSA 5.67 (H) 06/27/2025    URINECULTURE SEE NOTE (A) 05/20/2025       IMAGING:  === 01/15/24 ===  CT ABDOMEN PELVIS W IV CONTRAST  - Impression -  No acute abnormality in the abdomen or pelvis.  Nonobstructive bilateral renal calculi.  Prostatomegaly. Posterior bladder diverticula. Correlate clinically  for bladder outlet obstruction.    Multiple pulmonary nodules measuring up to 7 mm. Consider follow up  non contrast chest CT at 3-6 months, then consider CT chest at 18-24  months (Chris Tukcer et al., Guidelines for management of  incidental pulmonary nodules detected on CT images:  From the  [employer] 2017, Radiology. 2017 Jul;284 (1):228-243.)    PROCEDURES:  PVR: 100 mL  (7/29/25)    ASSESSMENT/PLAN:  Problem List Items Addressed This Visit       Elevated PSA    Relevant Orders    PSA     Other Visit Diagnoses         Urinary symptom or sign        Relevant Orders    POCT UA (nonautomated) manually resulted (Completed)      Kidney stones        Relevant Orders    XR abdomen 1 view          He has history of elevated PSA, at most to 8.2 in 7/21. He had a negative prostate biopsies in 2006, 2012, 2014, and 2020, and negative MRI in 2019. He had an elevated ExoDx. We discussed prostate MRI/biopsy vs conservative management. He has elected for conservative management. PSA ordered.   PSA summary  06/27/25 5.67  02/27/25 6.09  05/10/24 2.49  02/13/24 6.59  07/20/23 5.89    He has history of kidney stones. CT was viewed and identified bilateral small stones in January 2025, up to 6 mm on the right side and 5 mm on the left. He has elected for conservative management. KUB ordered.    He has history of BPH, treated with tamsulosin.       He had urinary retention of 388 mL on bladder scan in May 2025. His postvoid residual improved to 100 mL today. He is asymptomatic. We will hold on cystoscopy. Repeat bladder scan with followup.    He has erectile dysfunction. He was prescribed sildenafil in the past, however did not use the medication.     He has history of microscopic hematuria. Urinalysis 5/20/25 identified 40-60 WBC, few bacteria and 6-10 hyaline cast. No RBC's were seen.    POCT urinalysis identified trace blood, small leukocytes and positive nitrites today and urine will be sent for microscopy, culture, and sensitivity.     All questions were answered to the patient’s satisfaction.  Patient agrees with the plan and wishes to proceed.  Follow-up will be scheduled appropriately.     Nancy BHAKTA, am scribing for, and in the presence of Viraj Blum MD.     Viraj BHAKTA,  MD, personally performed the services described in the documentation as scribed by Nancy Rojas, in my presence, and confirm it is both accurate and complete.

## 2025-07-29 ENCOUNTER — APPOINTMENT (OUTPATIENT)
Dept: UROLOGY | Facility: CLINIC | Age: 80
End: 2025-07-29
Payer: MEDICARE

## 2025-07-29 DIAGNOSIS — R97.20 ELEVATED PSA: ICD-10-CM

## 2025-07-29 DIAGNOSIS — N20.0 KIDNEY STONES: ICD-10-CM

## 2025-07-29 DIAGNOSIS — R39.9 URINARY SYMPTOM OR SIGN: ICD-10-CM

## 2025-07-29 DIAGNOSIS — R33.9 URINARY RETENTION: Primary | ICD-10-CM

## 2025-07-29 DIAGNOSIS — N20.0 CALCULUS OF KIDNEY: ICD-10-CM

## 2025-07-29 PROBLEM — R82.81 PYURIA: Status: ACTIVE | Noted: 2025-07-29

## 2025-07-29 LAB
POC APPEARANCE, URINE: CLEAR
POC BILIRUBIN, URINE: NEGATIVE
POC BLOOD, URINE: ABNORMAL
POC COLOR, URINE: YELLOW
POC GLUCOSE, URINE: ABNORMAL MG/DL
POC KETONES, URINE: NEGATIVE MG/DL
POC LEUKOCYTES, URINE: ABNORMAL
POC NITRITE,URINE: POSITIVE
POC PH, URINE: 7 PH
POC PROTEIN, URINE: NEGATIVE MG/DL
POC SPECIFIC GRAVITY, URINE: 1.01
POC UROBILINOGEN, URINE: 1 EU/DL

## 2025-07-29 PROCEDURE — 81002 URINALYSIS NONAUTO W/O SCOPE: CPT | Performed by: UROLOGY

## 2025-07-29 PROCEDURE — 99214 OFFICE O/P EST MOD 30 MIN: CPT | Performed by: UROLOGY

## 2025-07-29 PROCEDURE — G2211 COMPLEX E/M VISIT ADD ON: HCPCS | Performed by: UROLOGY

## 2025-08-08 ENCOUNTER — APPOINTMENT (OUTPATIENT)
Dept: PRIMARY CARE | Facility: CLINIC | Age: 80
End: 2025-08-08
Payer: MEDICARE

## 2025-08-12 DIAGNOSIS — K22.719 BARRETT'S ESOPHAGUS WITH DYSPLASIA: ICD-10-CM

## 2025-08-13 RX ORDER — PANTOPRAZOLE SODIUM 40 MG/1
40 TABLET, DELAYED RELEASE ORAL
Qty: 90 TABLET | Refills: 1 | Status: SHIPPED | OUTPATIENT
Start: 2025-08-13

## 2026-01-27 ENCOUNTER — APPOINTMENT (OUTPATIENT)
Dept: UROLOGY | Facility: CLINIC | Age: 81
End: 2026-01-27
Payer: MEDICARE